# Patient Record
Sex: FEMALE | Race: WHITE | NOT HISPANIC OR LATINO
[De-identification: names, ages, dates, MRNs, and addresses within clinical notes are randomized per-mention and may not be internally consistent; named-entity substitution may affect disease eponyms.]

---

## 2017-08-23 PROBLEM — Z00.00 ENCOUNTER FOR PREVENTIVE HEALTH EXAMINATION: Status: ACTIVE | Noted: 2017-08-23

## 2017-09-22 ENCOUNTER — APPOINTMENT (OUTPATIENT)
Dept: ANTEPARTUM | Facility: CLINIC | Age: 37
End: 2017-09-22

## 2018-01-07 ENCOUNTER — INPATIENT (INPATIENT)
Facility: HOSPITAL | Age: 38
LOS: 3 days | Discharge: HOME | End: 2018-01-11
Attending: OBSTETRICS & GYNECOLOGY | Admitting: OBSTETRICS & GYNECOLOGY
Payer: COMMERCIAL

## 2018-01-07 VITALS — HEIGHT: 63 IN | WEIGHT: 198.42 LBS

## 2018-01-07 DIAGNOSIS — O26.899 OTHER SPECIFIED PREGNANCY RELATED CONDITIONS, UNSPECIFIED TRIMESTER: ICD-10-CM

## 2018-01-07 DIAGNOSIS — Z3A.00 WEEKS OF GESTATION OF PREGNANCY NOT SPECIFIED: ICD-10-CM

## 2018-01-07 LAB
ALBUMIN SERPL ELPH-MCNC: 2.8 G/DL — LOW (ref 3.3–5)
ALP SERPL-CCNC: 336 U/L — HIGH (ref 40–120)
ALT FLD-CCNC: 31 U/L — SIGNIFICANT CHANGE UP (ref 4–33)
APPEARANCE UR: SIGNIFICANT CHANGE UP
APTT BLD: 31 SEC — SIGNIFICANT CHANGE UP (ref 27.5–37.4)
AST SERPL-CCNC: 57 U/L — HIGH (ref 4–32)
BASOPHILS # BLD AUTO: 0.08 K/UL — SIGNIFICANT CHANGE UP (ref 0–0.2)
BASOPHILS NFR BLD AUTO: 0.5 % — SIGNIFICANT CHANGE UP (ref 0–2)
BILIRUB SERPL-MCNC: 0.2 MG/DL — SIGNIFICANT CHANGE UP (ref 0.2–1.2)
BILIRUB UR-MCNC: NEGATIVE — SIGNIFICANT CHANGE UP
BLD GP AB SCN SERPL QL: NEGATIVE — SIGNIFICANT CHANGE UP
BLOOD UR QL VISUAL: HIGH
BUN SERPL-MCNC: 18 MG/DL — SIGNIFICANT CHANGE UP (ref 7–23)
CALCIUM SERPL-MCNC: 8.8 MG/DL — SIGNIFICANT CHANGE UP (ref 8.4–10.5)
CHLORIDE SERPL-SCNC: 102 MMOL/L — SIGNIFICANT CHANGE UP (ref 98–107)
CO2 SERPL-SCNC: 17 MMOL/L — LOW (ref 22–31)
COLOR SPEC: YELLOW — SIGNIFICANT CHANGE UP
CREAT ?TM UR-MCNC: 217.96 MG/DL — SIGNIFICANT CHANGE UP
CREAT SERPL-MCNC: 1.04 MG/DL — SIGNIFICANT CHANGE UP (ref 0.5–1.3)
EOSINOPHIL # BLD AUTO: 0.03 K/UL — SIGNIFICANT CHANGE UP (ref 0–0.5)
EOSINOPHIL NFR BLD AUTO: 0.2 % — SIGNIFICANT CHANGE UP (ref 0–6)
FIBRINOGEN PPP-MCNC: 631.8 MG/DL — HIGH (ref 310–510)
GLUCOSE SERPL-MCNC: 84 MG/DL — SIGNIFICANT CHANGE UP (ref 70–99)
GLUCOSE UR-MCNC: NEGATIVE — SIGNIFICANT CHANGE UP
HCT VFR BLD CALC: 48.5 % — HIGH (ref 34.5–45)
HGB BLD-MCNC: 16.1 G/DL — HIGH (ref 11.5–15.5)
IMM GRANULOCYTES # BLD AUTO: 0.09 # — SIGNIFICANT CHANGE UP
IMM GRANULOCYTES NFR BLD AUTO: 0.6 % — SIGNIFICANT CHANGE UP (ref 0–1.5)
INR BLD: 0.77 — LOW (ref 0.88–1.17)
KETONES UR-MCNC: NEGATIVE — SIGNIFICANT CHANGE UP
LDH SERPL L TO P-CCNC: 427 U/L — HIGH (ref 135–225)
LEUKOCYTE ESTERASE UR-ACNC: NEGATIVE — SIGNIFICANT CHANGE UP
LYMPHOCYTES # BLD AUTO: 1.91 K/UL — SIGNIFICANT CHANGE UP (ref 1–3.3)
LYMPHOCYTES # BLD AUTO: 12.2 % — LOW (ref 13–44)
MCHC RBC-ENTMCNC: 29.4 PG — SIGNIFICANT CHANGE UP (ref 27–34)
MCHC RBC-ENTMCNC: 33.2 % — SIGNIFICANT CHANGE UP (ref 32–36)
MCV RBC AUTO: 88.7 FL — SIGNIFICANT CHANGE UP (ref 80–100)
MONOCYTES # BLD AUTO: 1.03 K/UL — HIGH (ref 0–0.9)
MONOCYTES NFR BLD AUTO: 6.6 % — SIGNIFICANT CHANGE UP (ref 2–14)
MUCOUS THREADS # UR AUTO: SIGNIFICANT CHANGE UP
NEUTROPHILS # BLD AUTO: 12.5 K/UL — HIGH (ref 1.8–7.4)
NEUTROPHILS NFR BLD AUTO: 79.9 % — HIGH (ref 43–77)
NITRITE UR-MCNC: NEGATIVE — SIGNIFICANT CHANGE UP
NON-SQ EPI CELLS # UR AUTO: 1 — SIGNIFICANT CHANGE UP
NRBC # FLD: 0 — SIGNIFICANT CHANGE UP
PH UR: 6.5 — SIGNIFICANT CHANGE UP (ref 4.6–8)
PLATELET # BLD AUTO: 247 K/UL — SIGNIFICANT CHANGE UP (ref 150–400)
PMV BLD: 14.1 FL — HIGH (ref 7–13)
POTASSIUM SERPL-MCNC: 4.5 MMOL/L — SIGNIFICANT CHANGE UP (ref 3.5–5.3)
POTASSIUM SERPL-SCNC: 4.5 MMOL/L — SIGNIFICANT CHANGE UP (ref 3.5–5.3)
PROT SERPL-MCNC: 6.3 G/DL — SIGNIFICANT CHANGE UP (ref 6–8.3)
PROT UR-MCNC: >600 MG/DL — SIGNIFICANT CHANGE UP
PROTHROM AB SERPL-ACNC: 8.8 SEC — LOW (ref 9.8–13.1)
RBC # BLD: 5.47 M/UL — HIGH (ref 3.8–5.2)
RBC # FLD: 12.6 % — SIGNIFICANT CHANGE UP (ref 10.3–14.5)
RBC CASTS # UR COMP ASSIST: SIGNIFICANT CHANGE UP (ref 0–?)
RH IG SCN BLD-IMP: POSITIVE — SIGNIFICANT CHANGE UP
RH IG SCN BLD-IMP: POSITIVE — SIGNIFICANT CHANGE UP
SODIUM SERPL-SCNC: 136 MMOL/L — SIGNIFICANT CHANGE UP (ref 135–145)
SP GR SPEC: > 1.04 — HIGH (ref 1–1.04)
SQUAMOUS # UR AUTO: SIGNIFICANT CHANGE UP
URATE SERPL-MCNC: 6.3 MG/DL — SIGNIFICANT CHANGE UP (ref 2.5–7)
UROBILINOGEN FLD QL: NORMAL MG/DL — SIGNIFICANT CHANGE UP
WBC # BLD: 15.64 K/UL — HIGH (ref 3.8–10.5)
WBC # FLD AUTO: 15.64 K/UL — HIGH (ref 3.8–10.5)
WBC UR QL: SIGNIFICANT CHANGE UP (ref 0–?)

## 2018-01-07 RX ORDER — INFLUENZA VIRUS VACCINE 15; 15; 15; 15 UG/.5ML; UG/.5ML; UG/.5ML; UG/.5ML
0.5 SUSPENSION INTRAMUSCULAR ONCE
Qty: 0 | Refills: 0 | Status: DISCONTINUED | OUTPATIENT
Start: 2018-01-07 | End: 2018-01-11

## 2018-01-07 RX ORDER — SODIUM CHLORIDE 9 MG/ML
1000 INJECTION, SOLUTION INTRAVENOUS
Qty: 0 | Refills: 0 | Status: DISCONTINUED | OUTPATIENT
Start: 2018-01-07 | End: 2018-01-08

## 2018-01-07 RX ORDER — MAGNESIUM SULFATE 500 MG/ML
4 VIAL (ML) INJECTION ONCE
Qty: 0 | Refills: 0 | Status: COMPLETED | OUTPATIENT
Start: 2018-01-07 | End: 2018-01-07

## 2018-01-07 RX ORDER — SODIUM CHLORIDE 9 MG/ML
1000 INJECTION, SOLUTION INTRAVENOUS ONCE
Qty: 0 | Refills: 0 | Status: DISCONTINUED | OUTPATIENT
Start: 2018-01-07 | End: 2018-01-08

## 2018-01-07 RX ORDER — LABETALOL HCL 100 MG
20 TABLET ORAL ONCE
Qty: 0 | Refills: 0 | Status: COMPLETED | OUTPATIENT
Start: 2018-01-07 | End: 2018-01-07

## 2018-01-07 RX ORDER — LABETALOL HCL 100 MG
40 TABLET ORAL ONCE
Qty: 0 | Refills: 0 | Status: COMPLETED | OUTPATIENT
Start: 2018-01-07 | End: 2018-01-07

## 2018-01-07 RX ORDER — ACETAMINOPHEN 500 MG
975 TABLET ORAL ONCE
Qty: 0 | Refills: 0 | Status: COMPLETED | OUTPATIENT
Start: 2018-01-07 | End: 2018-01-07

## 2018-01-07 RX ORDER — LABETALOL HCL 100 MG
80 TABLET ORAL ONCE
Qty: 0 | Refills: 0 | Status: COMPLETED | OUTPATIENT
Start: 2018-01-07 | End: 2018-01-07

## 2018-01-07 RX ORDER — OXYTOCIN 10 UNIT/ML
333.33 VIAL (ML) INJECTION
Qty: 20 | Refills: 0 | Status: DISCONTINUED | OUTPATIENT
Start: 2018-01-07 | End: 2018-01-08

## 2018-01-07 RX ORDER — LABETALOL HCL 100 MG
200 TABLET ORAL THREE TIMES A DAY
Qty: 0 | Refills: 0 | Status: DISCONTINUED | OUTPATIENT
Start: 2018-01-07 | End: 2018-01-08

## 2018-01-07 RX ORDER — MAGNESIUM SULFATE 500 MG/ML
1 VIAL (ML) INJECTION
Qty: 40 | Refills: 0 | Status: DISCONTINUED | OUTPATIENT
Start: 2018-01-07 | End: 2018-01-08

## 2018-01-07 RX ADMIN — Medication 200 MILLIGRAM(S): at 21:56

## 2018-01-07 RX ADMIN — SODIUM CHLORIDE 50 MILLILITER(S): 9 INJECTION, SOLUTION INTRAVENOUS at 21:00

## 2018-01-07 RX ADMIN — Medication 975 MILLIGRAM(S): at 21:58

## 2018-01-07 RX ADMIN — Medication 50 GM/HR: at 21:58

## 2018-01-07 RX ADMIN — Medication 20 MILLIGRAM(S): at 20:57

## 2018-01-07 RX ADMIN — Medication 40 MILLIGRAM(S): at 21:08

## 2018-01-07 RX ADMIN — Medication 80 MILLIGRAM(S): at 21:25

## 2018-01-07 RX ADMIN — Medication 300 GRAM(S): at 21:35

## 2018-01-08 ENCOUNTER — RESULT REVIEW (OUTPATIENT)
Age: 38
End: 2018-01-08

## 2018-01-08 ENCOUNTER — TRANSCRIPTION ENCOUNTER (OUTPATIENT)
Age: 38
End: 2018-01-08

## 2018-01-08 LAB
ALBUMIN SERPL ELPH-MCNC: 2 G/DL — LOW (ref 3.3–5)
ALBUMIN SERPL ELPH-MCNC: 2.3 G/DL — LOW (ref 3.3–5)
ALBUMIN SERPL ELPH-MCNC: 2.4 G/DL — LOW (ref 3.3–5)
ALBUMIN SERPL ELPH-MCNC: 2.4 G/DL — LOW (ref 3.3–5)
ALBUMIN SERPL ELPH-MCNC: 2.5 G/DL — LOW (ref 3.3–5)
ALP SERPL-CCNC: 220 U/L — HIGH (ref 40–120)
ALP SERPL-CCNC: 276 U/L — HIGH (ref 40–120)
ALP SERPL-CCNC: 277 U/L — HIGH (ref 40–120)
ALP SERPL-CCNC: 287 U/L — HIGH (ref 40–120)
ALP SERPL-CCNC: 289 U/L — HIGH (ref 40–120)
ALT FLD-CCNC: 24 U/L — SIGNIFICANT CHANGE UP (ref 4–33)
ALT FLD-CCNC: 30 U/L — SIGNIFICANT CHANGE UP (ref 4–33)
ALT FLD-CCNC: 30 U/L — SIGNIFICANT CHANGE UP (ref 4–33)
ALT FLD-CCNC: 31 U/L — SIGNIFICANT CHANGE UP (ref 4–33)
ALT FLD-CCNC: 32 U/L — SIGNIFICANT CHANGE UP (ref 4–33)
APTT BLD: 29.2 SEC — SIGNIFICANT CHANGE UP (ref 27.5–37.4)
APTT BLD: 29.3 SEC — SIGNIFICANT CHANGE UP (ref 27.5–37.4)
APTT BLD: 29.8 SEC — SIGNIFICANT CHANGE UP (ref 27.5–37.4)
AST SERPL-CCNC: 34 U/L — HIGH (ref 4–32)
AST SERPL-CCNC: 45 U/L — HIGH (ref 4–32)
AST SERPL-CCNC: 46 U/L — HIGH (ref 4–32)
AST SERPL-CCNC: 46 U/L — HIGH (ref 4–32)
AST SERPL-CCNC: 53 U/L — HIGH (ref 4–32)
BASOPHILS # BLD AUTO: 0.02 K/UL — SIGNIFICANT CHANGE UP (ref 0–0.2)
BASOPHILS # BLD AUTO: 0.04 K/UL — SIGNIFICANT CHANGE UP (ref 0–0.2)
BASOPHILS # BLD AUTO: 0.05 K/UL — SIGNIFICANT CHANGE UP (ref 0–0.2)
BASOPHILS # BLD AUTO: 0.05 K/UL — SIGNIFICANT CHANGE UP (ref 0–0.2)
BASOPHILS NFR BLD AUTO: 0.1 % — SIGNIFICANT CHANGE UP (ref 0–2)
BASOPHILS NFR BLD AUTO: 0.3 % — SIGNIFICANT CHANGE UP (ref 0–2)
BASOPHILS NFR BLD AUTO: 0.3 % — SIGNIFICANT CHANGE UP (ref 0–2)
BASOPHILS NFR BLD AUTO: 0.4 % — SIGNIFICANT CHANGE UP (ref 0–2)
BILIRUB SERPL-MCNC: < 0.2 MG/DL — LOW (ref 0.2–1.2)
BUN SERPL-MCNC: 17 MG/DL — SIGNIFICANT CHANGE UP (ref 7–23)
BUN SERPL-MCNC: 18 MG/DL — SIGNIFICANT CHANGE UP (ref 7–23)
BUN SERPL-MCNC: 18 MG/DL — SIGNIFICANT CHANGE UP (ref 7–23)
BUN SERPL-MCNC: 19 MG/DL — SIGNIFICANT CHANGE UP (ref 7–23)
BUN SERPL-MCNC: 19 MG/DL — SIGNIFICANT CHANGE UP (ref 7–23)
CALCIUM SERPL-MCNC: 6.8 MG/DL — LOW (ref 8.4–10.5)
CALCIUM SERPL-MCNC: 7.3 MG/DL — LOW (ref 8.4–10.5)
CALCIUM SERPL-MCNC: 7.3 MG/DL — LOW (ref 8.4–10.5)
CALCIUM SERPL-MCNC: 7.5 MG/DL — LOW (ref 8.4–10.5)
CALCIUM SERPL-MCNC: 7.8 MG/DL — LOW (ref 8.4–10.5)
CHLORIDE SERPL-SCNC: 100 MMOL/L — SIGNIFICANT CHANGE UP (ref 98–107)
CHLORIDE SERPL-SCNC: 100 MMOL/L — SIGNIFICANT CHANGE UP (ref 98–107)
CHLORIDE SERPL-SCNC: 101 MMOL/L — SIGNIFICANT CHANGE UP (ref 98–107)
CHLORIDE SERPL-SCNC: 105 MMOL/L — SIGNIFICANT CHANGE UP (ref 98–107)
CHLORIDE SERPL-SCNC: 99 MMOL/L — SIGNIFICANT CHANGE UP (ref 98–107)
CO2 SERPL-SCNC: 15 MMOL/L — LOW (ref 22–31)
CO2 SERPL-SCNC: 15 MMOL/L — LOW (ref 22–31)
CO2 SERPL-SCNC: 16 MMOL/L — LOW (ref 22–31)
CO2 SERPL-SCNC: 16 MMOL/L — LOW (ref 22–31)
CO2 SERPL-SCNC: 17 MMOL/L — LOW (ref 22–31)
CREAT SERPL-MCNC: 0.92 MG/DL — SIGNIFICANT CHANGE UP (ref 0.5–1.3)
CREAT SERPL-MCNC: 1.07 MG/DL — SIGNIFICANT CHANGE UP (ref 0.5–1.3)
CREAT SERPL-MCNC: 1.09 MG/DL — SIGNIFICANT CHANGE UP (ref 0.5–1.3)
CREAT SERPL-MCNC: 1.13 MG/DL — SIGNIFICANT CHANGE UP (ref 0.5–1.3)
CREAT SERPL-MCNC: 1.14 MG/DL — SIGNIFICANT CHANGE UP (ref 0.5–1.3)
EOSINOPHIL # BLD AUTO: 0 K/UL — SIGNIFICANT CHANGE UP (ref 0–0.5)
EOSINOPHIL # BLD AUTO: 0 K/UL — SIGNIFICANT CHANGE UP (ref 0–0.5)
EOSINOPHIL # BLD AUTO: 0.01 K/UL — SIGNIFICANT CHANGE UP (ref 0–0.5)
EOSINOPHIL # BLD AUTO: 0.01 K/UL — SIGNIFICANT CHANGE UP (ref 0–0.5)
EOSINOPHIL NFR BLD AUTO: 0 % — SIGNIFICANT CHANGE UP (ref 0–6)
EOSINOPHIL NFR BLD AUTO: 0 % — SIGNIFICANT CHANGE UP (ref 0–6)
EOSINOPHIL NFR BLD AUTO: 0.1 % — SIGNIFICANT CHANGE UP (ref 0–6)
EOSINOPHIL NFR BLD AUTO: 0.1 % — SIGNIFICANT CHANGE UP (ref 0–6)
FIBRINOGEN PPP-MCNC: 530 MG/DL — HIGH (ref 310–510)
FIBRINOGEN PPP-MCNC: 535 MG/DL — HIGH (ref 310–510)
FIBRINOGEN PPP-MCNC: 590 MG/DL — HIGH (ref 310–510)
FIBRINOGEN PPP-MCNC: 591 MG/DL — HIGH (ref 310–510)
GLUCOSE SERPL-MCNC: 100 MG/DL — HIGH (ref 70–99)
GLUCOSE SERPL-MCNC: 101 MG/DL — HIGH (ref 70–99)
GLUCOSE SERPL-MCNC: 102 MG/DL — HIGH (ref 70–99)
GLUCOSE SERPL-MCNC: 94 MG/DL — SIGNIFICANT CHANGE UP (ref 70–99)
GLUCOSE SERPL-MCNC: 98 MG/DL — SIGNIFICANT CHANGE UP (ref 70–99)
HCT VFR BLD CALC: 39.7 % — SIGNIFICANT CHANGE UP (ref 34.5–45)
HCT VFR BLD CALC: 41 % — SIGNIFICANT CHANGE UP (ref 34.5–45)
HCT VFR BLD CALC: 42.2 % — SIGNIFICANT CHANGE UP (ref 34.5–45)
HCT VFR BLD CALC: 42.7 % — SIGNIFICANT CHANGE UP (ref 34.5–45)
HGB BLD-MCNC: 13.9 G/DL — SIGNIFICANT CHANGE UP (ref 11.5–15.5)
HGB BLD-MCNC: 14.8 G/DL — SIGNIFICANT CHANGE UP (ref 11.5–15.5)
HGB BLD-MCNC: 14.9 G/DL — SIGNIFICANT CHANGE UP (ref 11.5–15.5)
HGB BLD-MCNC: 15.2 G/DL — SIGNIFICANT CHANGE UP (ref 11.5–15.5)
IMM GRANULOCYTES # BLD AUTO: 0.06 # — SIGNIFICANT CHANGE UP
IMM GRANULOCYTES # BLD AUTO: 0.06 # — SIGNIFICANT CHANGE UP
IMM GRANULOCYTES # BLD AUTO: 0.07 # — SIGNIFICANT CHANGE UP
IMM GRANULOCYTES # BLD AUTO: 0.1 # — SIGNIFICANT CHANGE UP
IMM GRANULOCYTES NFR BLD AUTO: 0.4 % — SIGNIFICANT CHANGE UP (ref 0–1.5)
IMM GRANULOCYTES NFR BLD AUTO: 0.4 % — SIGNIFICANT CHANGE UP (ref 0–1.5)
IMM GRANULOCYTES NFR BLD AUTO: 0.6 % — SIGNIFICANT CHANGE UP (ref 0–1.5)
IMM GRANULOCYTES NFR BLD AUTO: 0.8 % — SIGNIFICANT CHANGE UP (ref 0–1.5)
INR BLD: 0.77 — LOW (ref 0.88–1.17)
INR BLD: 0.79 — LOW (ref 0.88–1.17)
INR BLD: 0.8 — LOW (ref 0.88–1.17)
LDH SERPL L TO P-CCNC: 329 U/L — HIGH (ref 135–225)
LDH SERPL L TO P-CCNC: 358 U/L — HIGH (ref 135–225)
LDH SERPL L TO P-CCNC: 368 U/L — HIGH (ref 135–225)
LDH SERPL L TO P-CCNC: 382 U/L — HIGH (ref 135–225)
LYMPHOCYTES # BLD AUTO: 0.96 K/UL — LOW (ref 1–3.3)
LYMPHOCYTES # BLD AUTO: 1.46 K/UL — SIGNIFICANT CHANGE UP (ref 1–3.3)
LYMPHOCYTES # BLD AUTO: 1.52 K/UL — SIGNIFICANT CHANGE UP (ref 1–3.3)
LYMPHOCYTES # BLD AUTO: 1.56 K/UL — SIGNIFICANT CHANGE UP (ref 1–3.3)
LYMPHOCYTES # BLD AUTO: 10.3 % — LOW (ref 13–44)
LYMPHOCYTES # BLD AUTO: 12 % — LOW (ref 13–44)
LYMPHOCYTES # BLD AUTO: 13.2 % — SIGNIFICANT CHANGE UP (ref 13–44)
LYMPHOCYTES # BLD AUTO: 6.4 % — LOW (ref 13–44)
MAGNESIUM SERPL-MCNC: 4.4 MG/DL — HIGH (ref 1.6–2.6)
MAGNESIUM SERPL-MCNC: 6.1 MG/DL — HIGH (ref 1.6–2.6)
MAGNESIUM SERPL-MCNC: 7.1 MG/DL — CRITICAL HIGH (ref 1.6–2.6)
MAGNESIUM SERPL-MCNC: 7.8 MG/DL — CRITICAL HIGH (ref 1.6–2.6)
MCHC RBC-ENTMCNC: 31 PG — SIGNIFICANT CHANGE UP (ref 27–34)
MCHC RBC-ENTMCNC: 31.1 PG — SIGNIFICANT CHANGE UP (ref 27–34)
MCHC RBC-ENTMCNC: 31.6 PG — SIGNIFICANT CHANGE UP (ref 27–34)
MCHC RBC-ENTMCNC: 32.1 PG — SIGNIFICANT CHANGE UP (ref 27–34)
MCHC RBC-ENTMCNC: 35 % — SIGNIFICANT CHANGE UP (ref 32–36)
MCHC RBC-ENTMCNC: 35.1 % — SIGNIFICANT CHANGE UP (ref 32–36)
MCHC RBC-ENTMCNC: 35.6 % — SIGNIFICANT CHANGE UP (ref 32–36)
MCHC RBC-ENTMCNC: 36.3 % — HIGH (ref 32–36)
MCV RBC AUTO: 88.4 FL — SIGNIFICANT CHANGE UP (ref 80–100)
MCV RBC AUTO: 88.6 FL — SIGNIFICANT CHANGE UP (ref 80–100)
MCV RBC AUTO: 88.7 FL — SIGNIFICANT CHANGE UP (ref 80–100)
MCV RBC AUTO: 88.8 FL — SIGNIFICANT CHANGE UP (ref 80–100)
MONOCYTES # BLD AUTO: 0.59 K/UL — SIGNIFICANT CHANGE UP (ref 0–0.9)
MONOCYTES # BLD AUTO: 0.69 K/UL — SIGNIFICANT CHANGE UP (ref 0–0.9)
MONOCYTES # BLD AUTO: 0.74 K/UL — SIGNIFICANT CHANGE UP (ref 0–0.9)
MONOCYTES # BLD AUTO: 0.96 K/UL — HIGH (ref 0–0.9)
MONOCYTES NFR BLD AUTO: 3.9 % — SIGNIFICANT CHANGE UP (ref 2–14)
MONOCYTES NFR BLD AUTO: 4.7 % — SIGNIFICANT CHANGE UP (ref 2–14)
MONOCYTES NFR BLD AUTO: 6.1 % — SIGNIFICANT CHANGE UP (ref 2–14)
MONOCYTES NFR BLD AUTO: 8.1 % — SIGNIFICANT CHANGE UP (ref 2–14)
NEUTROPHILS # BLD AUTO: 12.47 K/UL — HIGH (ref 1.8–7.4)
NEUTROPHILS # BLD AUTO: 13.41 K/UL — HIGH (ref 1.8–7.4)
NEUTROPHILS # BLD AUTO: 9.17 K/UL — HIGH (ref 1.8–7.4)
NEUTROPHILS # BLD AUTO: 9.77 K/UL — HIGH (ref 1.8–7.4)
NEUTROPHILS NFR BLD AUTO: 77.7 % — HIGH (ref 43–77)
NEUTROPHILS NFR BLD AUTO: 80.6 % — HIGH (ref 43–77)
NEUTROPHILS NFR BLD AUTO: 84.3 % — HIGH (ref 43–77)
NEUTROPHILS NFR BLD AUTO: 89.2 % — HIGH (ref 43–77)
NRBC # FLD: 0 — SIGNIFICANT CHANGE UP
PLATELET # BLD AUTO: 191 K/UL — SIGNIFICANT CHANGE UP (ref 150–400)
PLATELET # BLD AUTO: 193 K/UL — SIGNIFICANT CHANGE UP (ref 150–400)
PLATELET # BLD AUTO: 195 K/UL — SIGNIFICANT CHANGE UP (ref 150–400)
PLATELET # BLD AUTO: 204 K/UL — SIGNIFICANT CHANGE UP (ref 150–400)
PMV BLD: 13.1 FL — HIGH (ref 7–13)
PMV BLD: 13.5 FL — HIGH (ref 7–13)
PMV BLD: 13.8 FL — HIGH (ref 7–13)
PMV BLD: 14 FL — HIGH (ref 7–13)
POTASSIUM SERPL-MCNC: 4.8 MMOL/L — SIGNIFICANT CHANGE UP (ref 3.5–5.3)
POTASSIUM SERPL-MCNC: 4.9 MMOL/L — SIGNIFICANT CHANGE UP (ref 3.5–5.3)
POTASSIUM SERPL-MCNC: 4.9 MMOL/L — SIGNIFICANT CHANGE UP (ref 3.5–5.3)
POTASSIUM SERPL-SCNC: 4.8 MMOL/L — SIGNIFICANT CHANGE UP (ref 3.5–5.3)
POTASSIUM SERPL-SCNC: 4.9 MMOL/L — SIGNIFICANT CHANGE UP (ref 3.5–5.3)
POTASSIUM SERPL-SCNC: 4.9 MMOL/L — SIGNIFICANT CHANGE UP (ref 3.5–5.3)
PROT SERPL-MCNC: 4.3 G/DL — LOW (ref 6–8.3)
PROT SERPL-MCNC: 5 G/DL — LOW (ref 6–8.3)
PROT SERPL-MCNC: 5.2 G/DL — LOW (ref 6–8.3)
PROT SERPL-MCNC: 5.4 G/DL — LOW (ref 6–8.3)
PROT SERPL-MCNC: 5.4 G/DL — LOW (ref 6–8.3)
PROT UR-MCNC: > 200 MG/DL — SIGNIFICANT CHANGE UP
PROTHROM AB SERPL-ACNC: 8.7 SEC — LOW (ref 9.8–13.1)
PROTHROM AB SERPL-ACNC: 8.8 SEC — LOW (ref 9.8–13.1)
PROTHROM AB SERPL-ACNC: 8.8 SEC — LOW (ref 9.8–13.1)
RBC # BLD: 4.48 M/UL — SIGNIFICANT CHANGE UP (ref 3.8–5.2)
RBC # BLD: 4.64 M/UL — SIGNIFICANT CHANGE UP (ref 3.8–5.2)
RBC # BLD: 4.76 M/UL — SIGNIFICANT CHANGE UP (ref 3.8–5.2)
RBC # BLD: 4.81 M/UL — SIGNIFICANT CHANGE UP (ref 3.8–5.2)
RBC # FLD: 12.7 % — SIGNIFICANT CHANGE UP (ref 10.3–14.5)
RBC # FLD: 12.8 % — SIGNIFICANT CHANGE UP (ref 10.3–14.5)
RBC # FLD: 12.9 % — SIGNIFICANT CHANGE UP (ref 10.3–14.5)
RBC # FLD: 12.9 % — SIGNIFICANT CHANGE UP (ref 10.3–14.5)
SODIUM SERPL-SCNC: 131 MMOL/L — LOW (ref 135–145)
SODIUM SERPL-SCNC: 132 MMOL/L — LOW (ref 135–145)
SODIUM SERPL-SCNC: 132 MMOL/L — LOW (ref 135–145)
SODIUM SERPL-SCNC: 133 MMOL/L — LOW (ref 135–145)
SODIUM SERPL-SCNC: 136 MMOL/L — SIGNIFICANT CHANGE UP (ref 135–145)
T PALLIDUM AB TITR SER: NEGATIVE — SIGNIFICANT CHANGE UP
URATE SERPL-MCNC: 6.4 MG/DL — SIGNIFICANT CHANGE UP (ref 2.5–7)
URATE SERPL-MCNC: 6.5 MG/DL — SIGNIFICANT CHANGE UP (ref 2.5–7)
URATE SERPL-MCNC: 6.8 MG/DL — SIGNIFICANT CHANGE UP (ref 2.5–7)
WBC # BLD: 11.81 K/UL — HIGH (ref 3.8–10.5)
WBC # BLD: 12.13 K/UL — HIGH (ref 3.8–10.5)
WBC # BLD: 14.79 K/UL — HIGH (ref 3.8–10.5)
WBC # BLD: 15.04 K/UL — HIGH (ref 3.8–10.5)
WBC # FLD AUTO: 11.81 K/UL — HIGH (ref 3.8–10.5)
WBC # FLD AUTO: 12.13 K/UL — HIGH (ref 3.8–10.5)
WBC # FLD AUTO: 14.79 K/UL — HIGH (ref 3.8–10.5)
WBC # FLD AUTO: 15.04 K/UL — HIGH (ref 3.8–10.5)

## 2018-01-08 PROCEDURE — 88307 TISSUE EXAM BY PATHOLOGIST: CPT | Mod: 26

## 2018-01-08 RX ORDER — CITRIC ACID/SODIUM CITRATE 300-500 MG
30 SOLUTION, ORAL ORAL ONCE
Qty: 0 | Refills: 0 | Status: DISCONTINUED | OUTPATIENT
Start: 2018-01-08 | End: 2018-01-08

## 2018-01-08 RX ORDER — OXYTOCIN 10 UNIT/ML
2 VIAL (ML) INJECTION
Qty: 30 | Refills: 0 | Status: DISCONTINUED | OUTPATIENT
Start: 2018-01-08 | End: 2018-01-08

## 2018-01-08 RX ORDER — LABETALOL HCL 100 MG
300 TABLET ORAL THREE TIMES A DAY
Qty: 0 | Refills: 0 | Status: DISCONTINUED | OUTPATIENT
Start: 2018-01-08 | End: 2018-01-08

## 2018-01-08 RX ORDER — LANOLIN
1 OINTMENT (GRAM) TOPICAL
Qty: 0 | Refills: 0 | Status: DISCONTINUED | OUTPATIENT
Start: 2018-01-09 | End: 2018-01-11

## 2018-01-08 RX ORDER — DIPHENHYDRAMINE HCL 50 MG
25 CAPSULE ORAL EVERY 6 HOURS
Qty: 0 | Refills: 0 | Status: DISCONTINUED | OUTPATIENT
Start: 2018-01-09 | End: 2018-01-11

## 2018-01-08 RX ORDER — GLYCERIN ADULT
1 SUPPOSITORY, RECTAL RECTAL AT BEDTIME
Qty: 0 | Refills: 0 | Status: DISCONTINUED | OUTPATIENT
Start: 2018-01-09 | End: 2018-01-11

## 2018-01-08 RX ORDER — SIMETHICONE 80 MG/1
80 TABLET, CHEWABLE ORAL EVERY 4 HOURS
Qty: 0 | Refills: 0 | Status: DISCONTINUED | OUTPATIENT
Start: 2018-01-09 | End: 2018-01-11

## 2018-01-08 RX ORDER — TETANUS TOXOID, REDUCED DIPHTHERIA TOXOID AND ACELLULAR PERTUSSIS VACCINE, ADSORBED 5; 2.5; 8; 8; 2.5 [IU]/.5ML; [IU]/.5ML; UG/.5ML; UG/.5ML; UG/.5ML
0.5 SUSPENSION INTRAMUSCULAR ONCE
Qty: 0 | Refills: 0 | Status: DISCONTINUED | OUTPATIENT
Start: 2018-01-09 | End: 2018-01-11

## 2018-01-08 RX ORDER — LABETALOL HCL 100 MG
300 TABLET ORAL EVERY 8 HOURS
Qty: 0 | Refills: 0 | Status: DISCONTINUED | OUTPATIENT
Start: 2018-01-08 | End: 2018-01-09

## 2018-01-08 RX ORDER — HYDRALAZINE HCL 50 MG
5 TABLET ORAL ONCE
Qty: 0 | Refills: 0 | Status: COMPLETED | OUTPATIENT
Start: 2018-01-08 | End: 2018-01-08

## 2018-01-08 RX ORDER — DOCUSATE SODIUM 100 MG
100 CAPSULE ORAL
Qty: 0 | Refills: 0 | Status: DISCONTINUED | OUTPATIENT
Start: 2018-01-09 | End: 2018-01-11

## 2018-01-08 RX ORDER — ACETAMINOPHEN 500 MG
975 TABLET ORAL ONCE
Qty: 0 | Refills: 0 | Status: COMPLETED | OUTPATIENT
Start: 2018-01-08 | End: 2018-01-08

## 2018-01-08 RX ORDER — OXYTOCIN 10 UNIT/ML
16.67 VIAL (ML) INJECTION
Qty: 20 | Refills: 0 | Status: DISCONTINUED | OUTPATIENT
Start: 2018-01-08 | End: 2018-01-09

## 2018-01-08 RX ORDER — MAGNESIUM SULFATE 500 MG/ML
2 VIAL (ML) INJECTION
Qty: 40 | Refills: 0 | Status: DISCONTINUED | OUTPATIENT
Start: 2018-01-08 | End: 2018-01-09

## 2018-01-08 RX ORDER — HEPARIN SODIUM 5000 [USP'U]/ML
5000 INJECTION INTRAVENOUS; SUBCUTANEOUS EVERY 12 HOURS
Qty: 0 | Refills: 0 | Status: DISCONTINUED | OUTPATIENT
Start: 2018-01-08 | End: 2018-01-11

## 2018-01-08 RX ORDER — ZOLPIDEM TARTRATE 10 MG/1
5 TABLET ORAL AT BEDTIME
Qty: 0 | Refills: 0 | Status: DISCONTINUED | OUTPATIENT
Start: 2018-01-09 | End: 2018-01-11

## 2018-01-08 RX ORDER — SODIUM CHLORIDE 9 MG/ML
1000 INJECTION, SOLUTION INTRAVENOUS
Qty: 0 | Refills: 0 | Status: DISCONTINUED | OUTPATIENT
Start: 2018-01-08 | End: 2018-01-09

## 2018-01-08 RX ORDER — FAMOTIDINE 10 MG/ML
20 INJECTION INTRAVENOUS ONCE
Qty: 0 | Refills: 0 | Status: DISCONTINUED | OUTPATIENT
Start: 2018-01-08 | End: 2018-01-08

## 2018-01-08 RX ORDER — OXYTOCIN 10 UNIT/ML
333.33 VIAL (ML) INJECTION
Qty: 20 | Refills: 0 | Status: DISCONTINUED | OUTPATIENT
Start: 2018-01-08 | End: 2018-01-09

## 2018-01-08 RX ORDER — FERROUS SULFATE 325(65) MG
325 TABLET ORAL DAILY
Qty: 0 | Refills: 0 | Status: DISCONTINUED | OUTPATIENT
Start: 2018-01-09 | End: 2018-01-11

## 2018-01-08 RX ORDER — SODIUM CHLORIDE 9 MG/ML
1000 INJECTION, SOLUTION INTRAVENOUS
Qty: 0 | Refills: 0 | Status: DISCONTINUED | OUTPATIENT
Start: 2018-01-08 | End: 2018-01-08

## 2018-01-08 RX ORDER — SODIUM CHLORIDE 9 MG/ML
1000 INJECTION, SOLUTION INTRAVENOUS ONCE
Qty: 0 | Refills: 0 | Status: DISCONTINUED | OUTPATIENT
Start: 2018-01-08 | End: 2018-01-08

## 2018-01-08 RX ORDER — METOCLOPRAMIDE HCL 10 MG
10 TABLET ORAL ONCE
Qty: 0 | Refills: 0 | Status: DISCONTINUED | OUTPATIENT
Start: 2018-01-08 | End: 2018-01-08

## 2018-01-08 RX ORDER — LABETALOL HCL 100 MG
200 TABLET ORAL THREE TIMES A DAY
Qty: 0 | Refills: 0 | Status: DISCONTINUED | OUTPATIENT
Start: 2018-01-08 | End: 2018-01-08

## 2018-01-08 RX ADMIN — Medication 975 MILLIGRAM(S): at 06:40

## 2018-01-08 RX ADMIN — FAMOTIDINE 20 MILLIGRAM(S): 10 INJECTION INTRAVENOUS at 18:40

## 2018-01-08 RX ADMIN — Medication 10 MILLIGRAM(S): at 18:40

## 2018-01-08 RX ADMIN — Medication 50 GM/HR: at 23:24

## 2018-01-08 RX ADMIN — Medication 300 MILLIGRAM(S): at 21:37

## 2018-01-08 RX ADMIN — Medication 975 MILLIGRAM(S): at 07:08

## 2018-01-08 RX ADMIN — SODIUM CHLORIDE 50 MILLILITER(S): 9 INJECTION, SOLUTION INTRAVENOUS at 08:27

## 2018-01-08 RX ADMIN — Medication 5 MILLIGRAM(S): at 13:24

## 2018-01-08 RX ADMIN — Medication 300 MILLIGRAM(S): at 14:05

## 2018-01-08 RX ADMIN — Medication 50 GM/HR: at 07:14

## 2018-01-08 RX ADMIN — HEPARIN SODIUM 5000 UNIT(S): 5000 INJECTION INTRAVENOUS; SUBCUTANEOUS at 23:35

## 2018-01-08 RX ADMIN — Medication 25 GM/HR: at 13:26

## 2018-01-08 RX ADMIN — Medication 30 MILLILITER(S): at 18:47

## 2018-01-08 RX ADMIN — Medication 200 MILLIGRAM(S): at 06:05

## 2018-01-08 NOTE — DISCHARGE NOTE OB - CARE PROVIDER_API CALL
Luis Colon), Obstetrics and Gynecology  [address] [place] [address]  Phone: [phone]  Fax: [phone]

## 2018-01-08 NOTE — DISCHARGE NOTE OB - PATIENT PORTAL LINK FT
“You can access the [facility] Patient Portal, offered by [facility], by registering with the following website: [email or web address]”

## 2018-01-08 NOTE — DISCHARGE NOTE OB - MEDICATION SUMMARY - MEDICATIONS TO TAKE
I will START or STAY ON the medications listed below when I get home from the hospital:  None I will START or STAY ON the medications listed below when I get home from the hospital:    Percocet 5/325 oral tablet  -- 1 tab(s) by mouth every 6 hours MDD:4 tabs  -- Caution federal law prohibits the transfer of this drug to any person other  than the person for whom it was prescribed.  May cause drowsiness.  Alcohol may intensify this effect.  Use care when operating dangerous machinery.  This prescription cannot be refilled.  This product contains acetaminophen.  Do not use  with any other product containing acetaminophen to prevent possible liver damage.  Using more of this medication than prescribed may cause serious breathing problems.    -- Indication: For severe pain    labetalol 200 mg oral tablet  -- 2 tab(s) by mouth 3 times a day  -- Indication: For blood pressure control    NIFEdipine 30 mg oral tablet, extended release  -- 1 tab(s) by mouth once a day  -- Indication: For blood pressure control

## 2018-01-09 DIAGNOSIS — O26.899 OTHER SPECIFIED PREGNANCY RELATED CONDITIONS, UNSPECIFIED TRIMESTER: ICD-10-CM

## 2018-01-09 LAB
ALBUMIN SERPL ELPH-MCNC: 2.2 G/DL — LOW (ref 3.3–5)
ALBUMIN SERPL ELPH-MCNC: 2.2 G/DL — LOW (ref 3.3–5)
ALP SERPL-CCNC: 194 U/L — HIGH (ref 40–120)
ALP SERPL-CCNC: 231 U/L — HIGH (ref 40–120)
ALT FLD-CCNC: 22 U/L — SIGNIFICANT CHANGE UP (ref 4–33)
ALT FLD-CCNC: 23 U/L — SIGNIFICANT CHANGE UP (ref 4–33)
APTT BLD: 28.3 SEC — SIGNIFICANT CHANGE UP (ref 27.5–37.4)
APTT BLD: 28.4 SEC — SIGNIFICANT CHANGE UP (ref 27.5–37.4)
AST SERPL-CCNC: 39 U/L — HIGH (ref 4–32)
AST SERPL-CCNC: 45 U/L — HIGH (ref 4–32)
BASOPHILS # BLD AUTO: 0.05 K/UL — SIGNIFICANT CHANGE UP (ref 0–0.2)
BASOPHILS # BLD AUTO: 0.05 K/UL — SIGNIFICANT CHANGE UP (ref 0–0.2)
BASOPHILS NFR BLD AUTO: 0.3 % — SIGNIFICANT CHANGE UP (ref 0–2)
BASOPHILS NFR BLD AUTO: 0.3 % — SIGNIFICANT CHANGE UP (ref 0–2)
BILIRUB SERPL-MCNC: < 0.2 MG/DL — LOW (ref 0.2–1.2)
BILIRUB SERPL-MCNC: < 0.2 MG/DL — LOW (ref 0.2–1.2)
BUN SERPL-MCNC: 19 MG/DL — SIGNIFICANT CHANGE UP (ref 7–23)
BUN SERPL-MCNC: 26 MG/DL — HIGH (ref 7–23)
CALCIUM SERPL-MCNC: 6.7 MG/DL — LOW (ref 8.4–10.5)
CALCIUM SERPL-MCNC: 6.8 MG/DL — LOW (ref 8.4–10.5)
CHLORIDE SERPL-SCNC: 101 MMOL/L — SIGNIFICANT CHANGE UP (ref 98–107)
CHLORIDE SERPL-SCNC: 98 MMOL/L — SIGNIFICANT CHANGE UP (ref 98–107)
CO2 SERPL-SCNC: 19 MMOL/L — LOW (ref 22–31)
CO2 SERPL-SCNC: 20 MMOL/L — LOW (ref 22–31)
CREAT SERPL-MCNC: 0.99 MG/DL — SIGNIFICANT CHANGE UP (ref 0.5–1.3)
CREAT SERPL-MCNC: 1.07 MG/DL — SIGNIFICANT CHANGE UP (ref 0.5–1.3)
EOSINOPHIL # BLD AUTO: 0.03 K/UL — SIGNIFICANT CHANGE UP (ref 0–0.5)
EOSINOPHIL # BLD AUTO: 0.14 K/UL — SIGNIFICANT CHANGE UP (ref 0–0.5)
EOSINOPHIL NFR BLD AUTO: 0.2 % — SIGNIFICANT CHANGE UP (ref 0–6)
EOSINOPHIL NFR BLD AUTO: 1 % — SIGNIFICANT CHANGE UP (ref 0–6)
FIBRINOGEN PPP-MCNC: 585.8 MG/DL — HIGH (ref 310–510)
FIBRINOGEN PPP-MCNC: 657.8 MG/DL — HIGH (ref 310–510)
GLUCOSE SERPL-MCNC: 103 MG/DL — HIGH (ref 70–99)
GLUCOSE SERPL-MCNC: 83 MG/DL — SIGNIFICANT CHANGE UP (ref 70–99)
HCT VFR BLD CALC: 36.3 % — SIGNIFICANT CHANGE UP (ref 34.5–45)
HCT VFR BLD CALC: 40.2 % — SIGNIFICANT CHANGE UP (ref 34.5–45)
HGB BLD-MCNC: 12.8 G/DL — SIGNIFICANT CHANGE UP (ref 11.5–15.5)
HGB BLD-MCNC: 13.9 G/DL — SIGNIFICANT CHANGE UP (ref 11.5–15.5)
IMM GRANULOCYTES # BLD AUTO: 0.06 # — SIGNIFICANT CHANGE UP
IMM GRANULOCYTES # BLD AUTO: 0.1 # — SIGNIFICANT CHANGE UP
IMM GRANULOCYTES NFR BLD AUTO: 0.4 % — SIGNIFICANT CHANGE UP (ref 0–1.5)
IMM GRANULOCYTES NFR BLD AUTO: 0.7 % — SIGNIFICANT CHANGE UP (ref 0–1.5)
INR BLD: 0.73 — LOW (ref 0.88–1.17)
INR BLD: 0.78 — LOW (ref 0.88–1.17)
LDH SERPL L TO P-CCNC: 410 U/L — HIGH (ref 135–225)
LDH SERPL L TO P-CCNC: 414 U/L — HIGH (ref 135–225)
LYMPHOCYTES # BLD AUTO: 1.75 K/UL — SIGNIFICANT CHANGE UP (ref 1–3.3)
LYMPHOCYTES # BLD AUTO: 1.87 K/UL — SIGNIFICANT CHANGE UP (ref 1–3.3)
LYMPHOCYTES # BLD AUTO: 11.2 % — LOW (ref 13–44)
LYMPHOCYTES # BLD AUTO: 12.8 % — LOW (ref 13–44)
MAGNESIUM SERPL-MCNC: 7.5 MG/DL — CRITICAL HIGH (ref 1.6–2.6)
MCHC RBC-ENTMCNC: 30.6 PG — SIGNIFICANT CHANGE UP (ref 27–34)
MCHC RBC-ENTMCNC: 31.4 PG — SIGNIFICANT CHANGE UP (ref 27–34)
MCHC RBC-ENTMCNC: 34.6 % — SIGNIFICANT CHANGE UP (ref 32–36)
MCHC RBC-ENTMCNC: 35.3 % — SIGNIFICANT CHANGE UP (ref 32–36)
MCV RBC AUTO: 88.5 FL — SIGNIFICANT CHANGE UP (ref 80–100)
MCV RBC AUTO: 89.2 FL — SIGNIFICANT CHANGE UP (ref 80–100)
MONOCYTES # BLD AUTO: 1.02 K/UL — HIGH (ref 0–0.9)
MONOCYTES # BLD AUTO: 1.15 K/UL — HIGH (ref 0–0.9)
MONOCYTES NFR BLD AUTO: 7 % — SIGNIFICANT CHANGE UP (ref 2–14)
MONOCYTES NFR BLD AUTO: 7.4 % — SIGNIFICANT CHANGE UP (ref 2–14)
NEUTROPHILS # BLD AUTO: 11.48 K/UL — HIGH (ref 1.8–7.4)
NEUTROPHILS # BLD AUTO: 12.6 K/UL — HIGH (ref 1.8–7.4)
NEUTROPHILS NFR BLD AUTO: 78.2 % — HIGH (ref 43–77)
NEUTROPHILS NFR BLD AUTO: 80.5 % — HIGH (ref 43–77)
NRBC # FLD: 0 — SIGNIFICANT CHANGE UP
NRBC # FLD: 0.02 — SIGNIFICANT CHANGE UP
PLATELET # BLD AUTO: 171 K/UL — SIGNIFICANT CHANGE UP (ref 150–400)
PLATELET # BLD AUTO: 205 K/UL — SIGNIFICANT CHANGE UP (ref 150–400)
PMV BLD: 13.2 FL — HIGH (ref 7–13)
PMV BLD: 13.3 FL — HIGH (ref 7–13)
POTASSIUM SERPL-MCNC: 4.8 MMOL/L — SIGNIFICANT CHANGE UP (ref 3.5–5.3)
POTASSIUM SERPL-MCNC: 4.8 MMOL/L — SIGNIFICANT CHANGE UP (ref 3.5–5.3)
POTASSIUM SERPL-SCNC: 4.8 MMOL/L — SIGNIFICANT CHANGE UP (ref 3.5–5.3)
POTASSIUM SERPL-SCNC: 4.8 MMOL/L — SIGNIFICANT CHANGE UP (ref 3.5–5.3)
PROT SERPL-MCNC: 4.7 G/DL — LOW (ref 6–8.3)
PROT SERPL-MCNC: 4.8 G/DL — LOW (ref 6–8.3)
PROTHROM AB SERPL-ACNC: 8.3 SEC — LOW (ref 9.8–13.1)
PROTHROM AB SERPL-ACNC: 8.9 SEC — LOW (ref 9.8–13.1)
RBC # BLD: 4.07 M/UL — SIGNIFICANT CHANGE UP (ref 3.8–5.2)
RBC # BLD: 4.54 M/UL — SIGNIFICANT CHANGE UP (ref 3.8–5.2)
RBC # FLD: 13 % — SIGNIFICANT CHANGE UP (ref 10.3–14.5)
RBC # FLD: 13 % — SIGNIFICANT CHANGE UP (ref 10.3–14.5)
SODIUM SERPL-SCNC: 129 MMOL/L — LOW (ref 135–145)
SODIUM SERPL-SCNC: 131 MMOL/L — LOW (ref 135–145)
URATE SERPL-MCNC: 6.2 MG/DL — SIGNIFICANT CHANGE UP (ref 2.5–7)
URATE SERPL-MCNC: 6.5 MG/DL — SIGNIFICANT CHANGE UP (ref 2.5–7)
WBC # BLD: 14.66 K/UL — HIGH (ref 3.8–10.5)
WBC # BLD: 15.64 K/UL — HIGH (ref 3.8–10.5)
WBC # FLD AUTO: 14.66 K/UL — HIGH (ref 3.8–10.5)
WBC # FLD AUTO: 15.64 K/UL — HIGH (ref 3.8–10.5)

## 2018-01-09 RX ORDER — OXYCODONE HYDROCHLORIDE 5 MG/1
5 TABLET ORAL
Qty: 0 | Refills: 0 | Status: DISCONTINUED | OUTPATIENT
Start: 2018-01-09 | End: 2018-01-11

## 2018-01-09 RX ORDER — IBUPROFEN 200 MG
600 TABLET ORAL EVERY 6 HOURS
Qty: 0 | Refills: 0 | Status: DISCONTINUED | OUTPATIENT
Start: 2018-01-09 | End: 2018-01-09

## 2018-01-09 RX ORDER — NIFEDIPINE 30 MG
30 TABLET, EXTENDED RELEASE 24 HR ORAL DAILY
Qty: 0 | Refills: 0 | Status: DISCONTINUED | OUTPATIENT
Start: 2018-01-10 | End: 2018-01-11

## 2018-01-09 RX ORDER — OXYTOCIN 10 UNIT/ML
41.67 VIAL (ML) INJECTION
Qty: 20 | Refills: 0 | Status: DISCONTINUED | OUTPATIENT
Start: 2018-01-09 | End: 2018-01-09

## 2018-01-09 RX ORDER — OXYCODONE HYDROCHLORIDE 5 MG/1
5 TABLET ORAL
Qty: 0 | Refills: 0 | Status: COMPLETED | OUTPATIENT
Start: 2018-01-09 | End: 2018-01-16

## 2018-01-09 RX ORDER — ACETAMINOPHEN 500 MG
650 TABLET ORAL EVERY 6 HOURS
Qty: 0 | Refills: 0 | Status: DISCONTINUED | OUTPATIENT
Start: 2018-01-09 | End: 2018-01-09

## 2018-01-09 RX ORDER — SODIUM CHLORIDE 9 MG/ML
1000 INJECTION, SOLUTION INTRAVENOUS
Qty: 0 | Refills: 0 | Status: DISCONTINUED | OUTPATIENT
Start: 2018-01-09 | End: 2018-01-09

## 2018-01-09 RX ORDER — LABETALOL HCL 100 MG
100 TABLET ORAL ONCE
Qty: 0 | Refills: 0 | Status: COMPLETED | OUTPATIENT
Start: 2018-01-09 | End: 2018-01-09

## 2018-01-09 RX ORDER — LEVETIRACETAM 250 MG/1
500 TABLET, FILM COATED ORAL
Qty: 0 | Refills: 0 | Status: COMPLETED | OUTPATIENT
Start: 2018-01-09 | End: 2018-01-09

## 2018-01-09 RX ORDER — OXYCODONE AND ACETAMINOPHEN 5; 325 MG/1; MG/1
2 TABLET ORAL EVERY 6 HOURS
Qty: 0 | Refills: 0 | Status: DISCONTINUED | OUTPATIENT
Start: 2018-01-09 | End: 2018-01-09

## 2018-01-09 RX ORDER — ACETAMINOPHEN 500 MG
975 TABLET ORAL EVERY 6 HOURS
Qty: 0 | Refills: 0 | Status: DISCONTINUED | OUTPATIENT
Start: 2018-01-09 | End: 2018-01-11

## 2018-01-09 RX ORDER — OXYCODONE HYDROCHLORIDE 5 MG/1
5 TABLET ORAL EVERY 4 HOURS
Qty: 0 | Refills: 0 | Status: DISCONTINUED | OUTPATIENT
Start: 2018-01-09 | End: 2018-01-11

## 2018-01-09 RX ORDER — KETOROLAC TROMETHAMINE 30 MG/ML
30 SYRINGE (ML) INJECTION EVERY 6 HOURS
Qty: 0 | Refills: 0 | Status: DISCONTINUED | OUTPATIENT
Start: 2018-01-09 | End: 2018-01-09

## 2018-01-09 RX ORDER — OXYCODONE AND ACETAMINOPHEN 5; 325 MG/1; MG/1
1 TABLET ORAL
Qty: 0 | Refills: 0 | Status: DISCONTINUED | OUTPATIENT
Start: 2018-01-09 | End: 2018-01-09

## 2018-01-09 RX ORDER — LABETALOL HCL 100 MG
400 TABLET ORAL THREE TIMES A DAY
Qty: 0 | Refills: 0 | Status: DISCONTINUED | OUTPATIENT
Start: 2018-01-09 | End: 2018-01-11

## 2018-01-09 RX ORDER — LABETALOL HCL 100 MG
20 TABLET ORAL ONCE
Qty: 0 | Refills: 0 | Status: COMPLETED | OUTPATIENT
Start: 2018-01-09 | End: 2018-01-09

## 2018-01-09 RX ADMIN — Medication 30 MILLIGRAM(S): at 14:07

## 2018-01-09 RX ADMIN — LEVETIRACETAM 500 MILLIGRAM(S): 250 TABLET, FILM COATED ORAL at 11:56

## 2018-01-09 RX ADMIN — HEPARIN SODIUM 5000 UNIT(S): 5000 INJECTION INTRAVENOUS; SUBCUTANEOUS at 11:57

## 2018-01-09 RX ADMIN — Medication 100 MILLIGRAM(S): at 22:46

## 2018-01-09 RX ADMIN — Medication 100 MILLIGRAM(S): at 11:57

## 2018-01-09 RX ADMIN — Medication 300 MILLIGRAM(S): at 14:06

## 2018-01-09 RX ADMIN — Medication 300 MILLIGRAM(S): at 22:08

## 2018-01-09 RX ADMIN — Medication 30 MILLIGRAM(S): at 14:52

## 2018-01-09 RX ADMIN — SODIUM CHLORIDE 125 MILLILITER(S): 9 INJECTION, SOLUTION INTRAVENOUS at 19:00

## 2018-01-09 RX ADMIN — Medication 30 MILLIGRAM(S): at 06:10

## 2018-01-09 RX ADMIN — LEVETIRACETAM 500 MILLIGRAM(S): 250 TABLET, FILM COATED ORAL at 22:14

## 2018-01-09 RX ADMIN — Medication 30 MILLIGRAM(S): at 06:25

## 2018-01-09 RX ADMIN — OXYCODONE HYDROCHLORIDE 5 MILLIGRAM(S): 5 TABLET ORAL at 19:59

## 2018-01-09 RX ADMIN — Medication 1 TABLET(S): at 11:57

## 2018-01-09 RX ADMIN — Medication 50 GM/HR: at 03:48

## 2018-01-09 RX ADMIN — Medication 325 MILLIGRAM(S): at 11:57

## 2018-01-09 RX ADMIN — Medication 20 MILLIGRAM(S): at 22:58

## 2018-01-09 RX ADMIN — Medication 300 MILLIGRAM(S): at 06:10

## 2018-01-09 RX ADMIN — Medication 50 GM/HR: at 04:51

## 2018-01-09 NOTE — PROVIDER CONTACT NOTE (OTHER) - ACTION/TREATMENT ORDERED:
Dr Harley to discuss with chief resident and will notify RN of further action to be ordered. Will continue to monitor.

## 2018-01-09 NOTE — PROGRESS NOTE ADULT - ASSESSMENT
37y old w SPEC, on Mg, which is currently being held this AM, on labetalol, s/p C/S. Pt stable.  POD # 1

## 2018-01-09 NOTE — LACTATION INITIAL EVALUATION - LACTATION INTERVENTIONS
assisted with deep latch and positioning ,discussed  signs  of  effective  feeding and  swallowing.  discussed  compression at  breast when  nbn  stops  drinking  and  is  still sucking.  ,  discussed  how to  wake  nbn  ,  discussed  feed on  cue  ,  call when  nbn  showing  cues  . discussed  late    behavior .  instructed  to offer both  breast at a feeding ,feed on cue and safe  skin to skin.  discussed  using  double  electric  pump  and  mother  states  would  let  us  know./initiate skin to skin/initiate hand expression routine

## 2018-01-09 NOTE — LACTATION INITIAL EVALUATION - INTERVENTION OUTCOME
mother  states  would  like  to  nurse  and  bottle  feed  ,  discussed risks   and  discussed alternative  feeding./verbalizes understanding verbalizes understanding/nbn  sleepy at  breast  sucks   two  times  and  comes  off  breast. mother  states  would  like  to  nurse  and  bottle  feed  ,  discussed risks   and  discussed alternative  feeding. nbn  sleepy at  breast  sucks   two  times  and  comes  off  breast. mother  states  would  like  to  nurse  and  bottle  feed  ,  discussed risks   and  discussed alternative  feeding..  discussed triple feeding  plan  with  mother  and  rn  if  mother  decides./verbalizes understanding

## 2018-01-09 NOTE — PROVIDER CONTACT NOTE (OTHER) - SITUATION
Patient's bloodpressure at 1400 was 143/95.Dr Baeza made aware.Patient's harding output from 1000 to 1400 was 200cc.Tena Bradshaw Np made aware.

## 2018-01-09 NOTE — PROVIDER CONTACT NOTE (OTHER) - BACKGROUND
Pt is s/p C/S on 1/8/18 at 19:13. Induction for severe preeclampsia, currently on Magnesium therapy at 2gm/hr.

## 2018-01-09 NOTE — CHART NOTE - NSCHARTNOTEFT_GEN_A_CORE
R1 OB Postpartum Event Note    S - Called to bedside by MP for pt with severe range BPs.  Bp was 150/104 and 160/92 at 9:30p and pt was given her due dose of 300mg labetalol po.  At 10:30p the BP was 160/104 and 160/98.  HR has been in the 70s and 80s.  The MP gave an extra 100mg labetalol po and ordered IV labetalol 20mg.  HELLP labs were sent.    At bedside the pt denies any symptoms.  She denies HA, visual changes, SOB, ruq/epigastric pain, swelling of hands/face.  She is making normal straw colored urine w/o difficulty.  She tolerated po and ambulates w/o difficulty.      O -   Vital Signs Last 24 Hrs  T(C): 36.5 (2018 21:25), Max: 36.9 (2018 10:00)  T(F): 97.7 (2018 21:25), Max: 98.4 (2018 10:00)  HR: 85 (2018 21:25) (71 - 90)  BP: 160/92 (2018 21:32) (120/80 - 160/92)  BP(mean): 108 (2018 02:00) (84 - 108)  RR: 17 (2018 21:25) (12 - 23)  SpO2: 97% (2018 21:25) (92% - 100%)    EXAM:  Gen: nad, a&ox3  CV: rrr, no m/g/r, nl s1/s2  RESP: CTAB, no crackles  ABD: soft, NT, +BS, incision wnl  EXT: wwp, normal b/l pedal edema, no ttp  Neuro: patellar reflexes brisk b/l    LABS:                        12.8   14.66 )-----------( 171      ( 2018 22:00 )             36.3    @ 22:00    129  |  98  |  26  ----------------------------<  83  4.8   |  20  |  1.07    LIVER FUNCTIONS - ( 2018 22:00 )  Alb: 2.2 g/dL / Pro: 4.7 g/dL / ALK PHOS: 194 u/L / ALT: 22 u/L / AST: 45 u/L / GGT: x    A/P:  36yo  now POD1 s/p pLTCS for sPEC, s/p Mg, on Keppra for seizure prophylaxis with persistently elevated BPs in severe range   - f/u stat HELLP Labs   - Continue Labetalol 400mg PO TID   - recheck BP 15min after 20mg labetalol IVP -> plan for 40mg IVP if pressure is still severe   - start Procardia XL 30 qAM   - continue Keppra 500mg BID    d/w Dr. Aryan Gibbs MD PGY1

## 2018-01-09 NOTE — PROGRESS NOTE ADULT - PROBLEM SELECTOR PLAN 1
- increase out of bed and ambulation  - analgesia prn  - continue regular diet  - check CBC  - D/C mehdi (UOP adequate)  - incentive spirometry  - continue labetalol  - will discuss when to restart Mg    ARIELLE Seo, PGY1 - increase out of bed and ambulation  - analgesia prn  - continue regular diet  - check CBC  - D/C mehdi (UOP adequate)  - incentive spirometry  - continue labetalol  - will discontinue Mg, start Keppra for seizure prophylaxis    D/w VERONICA Nuñez PGY4  ARIELLE Seo, PGY1

## 2018-01-10 ENCOUNTER — TRANSCRIPTION ENCOUNTER (OUTPATIENT)
Age: 38
End: 2018-01-10

## 2018-01-10 RX ORDER — NIFEDIPINE 30 MG
10 TABLET, EXTENDED RELEASE 24 HR ORAL ONCE
Qty: 0 | Refills: 0 | Status: COMPLETED | OUTPATIENT
Start: 2018-01-10 | End: 2018-01-10

## 2018-01-10 RX ORDER — LABETALOL HCL 100 MG
40 TABLET ORAL ONCE
Qty: 0 | Refills: 0 | Status: DISCONTINUED | OUTPATIENT
Start: 2018-01-10 | End: 2018-01-10

## 2018-01-10 RX ADMIN — Medication 975 MILLIGRAM(S): at 14:03

## 2018-01-10 RX ADMIN — Medication 30 MILLIGRAM(S): at 01:34

## 2018-01-10 RX ADMIN — Medication 100 MILLIGRAM(S): at 02:59

## 2018-01-10 RX ADMIN — Medication 325 MILLIGRAM(S): at 12:51

## 2018-01-10 RX ADMIN — Medication 975 MILLIGRAM(S): at 00:23

## 2018-01-10 RX ADMIN — HEPARIN SODIUM 5000 UNIT(S): 5000 INJECTION INTRAVENOUS; SUBCUTANEOUS at 02:58

## 2018-01-10 RX ADMIN — Medication 975 MILLIGRAM(S): at 02:58

## 2018-01-10 RX ADMIN — Medication 400 MILLIGRAM(S): at 22:02

## 2018-01-10 RX ADMIN — Medication 1 TABLET(S): at 12:52

## 2018-01-10 RX ADMIN — Medication 10 MILLIGRAM(S): at 01:10

## 2018-01-10 RX ADMIN — Medication 400 MILLIGRAM(S): at 05:53

## 2018-01-10 RX ADMIN — Medication 30 MILLIGRAM(S): at 23:45

## 2018-01-10 RX ADMIN — HEPARIN SODIUM 5000 UNIT(S): 5000 INJECTION INTRAVENOUS; SUBCUTANEOUS at 17:44

## 2018-01-10 RX ADMIN — Medication 975 MILLIGRAM(S): at 22:02

## 2018-01-10 RX ADMIN — Medication 975 MILLIGRAM(S): at 12:51

## 2018-01-10 RX ADMIN — Medication 400 MILLIGRAM(S): at 14:04

## 2018-01-10 RX ADMIN — Medication 975 MILLIGRAM(S): at 03:20

## 2018-01-10 RX ADMIN — Medication 100 MILLIGRAM(S): at 17:45

## 2018-01-10 NOTE — PROGRESS NOTE ADULT - PROBLEM SELECTOR PLAN 1
- increase out of bed and ambulation  - analgesia PRN  - continue regular diet  - continue to monitor pressures, continue labetalol 400 TID and procardia 30 qAM    ARIELLE Seo, PGY1 - increase out of bed and ambulation  - analgesia PRN  - continue regular diet  - pt disorientation now resolved, will continue to monitor for reoccurence  - continue to monitor pressures, continue labetalol 400 TID and procardia 30 qAM    ARIELLE Seo, PGY1

## 2018-01-10 NOTE — PROVIDER CONTACT NOTE (OTHER) - ACTION/TREATMENT ORDERED:
Give ordered dose of labetalol 300 mg and repeat blood pressure in a half hour as per KIMBERLEY calderon

## 2018-01-10 NOTE — CHART NOTE - NSCHARTNOTEFT_GEN_A_CORE
R1 OB Postpartum Event Note    S - called to pt's bedside for persistent elevated BPs.  At 9:30 pt's bp was 150/104 and then 160/92 15min later.  Pt was given her dose of 300mg Labetalol po.  At 10:30p the bp was 160/104 and repeat 15min later was 160/98.  The pt was given IV labetalol 20mg x1 and an additional 100mg of labetalol po.  Repeat pressure was then 148/98.  STAT HELLP labs were sent.    Initialy at the bedside the patient reported feeling tired but otherwise well.  She denied any HA, visual changes, N/V, epigastric/ruq abd pain, SOB, or dizzyness.  She reports ambulating w/o difficulty, urinating spontaneously clear light yellow urine, and tolerating normal diet.  Initial exam was WNL.    Later at 12:30p the BP was again 164/108 and the repeat was 162/106.  During this time the patient seemed groggy and disoriented when awoken from deep sleep.  She endorsed feeling confused and "out of it" since she took oxycodone for the first time earlier this evening.  She was given 10mg Nifedipine IR po and started on Procardia XL 30mg po qd.  Repeat BP was then 124/90 and the pt was moved to [facility] for closer monitoring.      O -  Vital Signs Last 24 Hrs  T(C): 36.8 (10 Nick 2018 05:40), Max: 36.9 (2018 10:00)  T(F): 98.2 (10 Nick 2018 05:40), Max: 98.4 (2018 10:00)  HR: 74 (10 Nick 2018 05:40) (71 - 88)  BP: 117/68 (10 Nick 2018 05:40) (95/62 - 164/108)  RR: 16 (10 Nick 2018 05:40) (16 - 18)  SpO2: 96% (10 Nick 2018 05:40) (96% - 100%)    EXAM (at 12:30p):  Gen: nad, groggy, oriented to self and situation but not to date or place, tearful intermittently  CV: RRR, nl s1/s2  LUNG: CTAB  ABD: soft, NT, +BS, incision wnl  EXT: wwp, NT  NEURO: slow to respond to commands, slow speech but no slurred speech, cranial nerves intact, Full ROM UE/LE b/l, sensation intact in face/UE/LE, 5/5 strength UE/LE b/l, patellar reflexes brisk b/l    EXAM (at 12:45p):  Gen: nad, a&ox3  NEURO: normal response times, no slurred speech, cranial nerves intact, Full ROM UE/LE b/l, sensation intact in face/UE/LE, 5/5 strength UE/LE b/l, patellar reflexes brisk b/l    LABS:             12.8   14.66 )-----------( 171      (  @ 22:00 )             36.3      @ 22:00    129  |  98  |  26  ----------------------------<  83  4.8   |  20  |  1.07    LIVER FUNCTIONS - ( 2018 22:00 )  Alb: 2.2 g/dL / Pro: 4.7 g/dL / ALK PHOS: 194 u/L / ALT: 22 u/L / AST: 45 u/L / GGT: x             A/P: 38yo  now POD1 s/p pLTCS for sPEC, s/p Mg, on Keppra for seizure prophylaxis w/ persistently elevated BP pp   - HELLP labs notable for slight increase in Cr from 0.99->1.07, o/w stable   - transient disorientation resolved after nifedipine 10mg po and resolution of hypertension   - continue w/ procardia xl 30mg po qd   - continue w/ labetalol 400mg po tid   - move to 4T    d/w Dr. Aryan Gibbs MD PGY1

## 2018-01-11 VITALS
OXYGEN SATURATION: 98 % | RESPIRATION RATE: 18 BRPM | SYSTOLIC BLOOD PRESSURE: 128 MMHG | HEART RATE: 87 BPM | TEMPERATURE: 99 F | DIASTOLIC BLOOD PRESSURE: 87 MMHG

## 2018-01-11 RX ORDER — LABETALOL HCL 100 MG
2 TABLET ORAL
Qty: 180 | Refills: 0
Start: 2018-01-11 | End: 2018-02-09

## 2018-01-11 RX ORDER — NIFEDIPINE 30 MG
1 TABLET, EXTENDED RELEASE 24 HR ORAL
Qty: 30 | Refills: 0
Start: 2018-01-11 | End: 2018-02-09

## 2018-01-11 RX ADMIN — Medication 100 MILLIGRAM(S): at 05:40

## 2018-01-11 RX ADMIN — Medication 400 MILLIGRAM(S): at 13:31

## 2018-01-11 RX ADMIN — Medication 975 MILLIGRAM(S): at 05:40

## 2018-01-11 RX ADMIN — HEPARIN SODIUM 5000 UNIT(S): 5000 INJECTION INTRAVENOUS; SUBCUTANEOUS at 05:40

## 2018-01-11 RX ADMIN — Medication 400 MILLIGRAM(S): at 05:50

## 2018-01-11 RX ADMIN — Medication 975 MILLIGRAM(S): at 06:30

## 2018-01-11 NOTE — PROGRESS NOTE ADULT - SUBJECTIVE AND OBJECTIVE BOX
Postpartum Note,  Section  She is a 37y woman w sPEC who is now post-operative day: 1    Subjective:  The patient feels well, no acute complaints.  Not yet ambulating due to magnesium being on, tolerating PO diet, denies nausea/vomiting.  Hunter in place, draining well, and passing gas.  Pain is controlled. Reports normal postpartum bleeding.    This AM, mag level was at 7.5, discontinued at 5:15am.  Will discuss w attending on when and at what level to run it at.    Denies HA, vision changes, RUQ/epigastric pain, SOB.    Physical exam:    Vital Signs Last 24 Hrs  T(C): 36.6 (2018 06:10), Max: 36.8 (2018 04:35)  T(F): 97.9 (2018 06:10), Max: 98.2 (2018 04:35)  HR: 78 (2018 06:10) (76 - 90)  BP: 136/81 (2018 06:10) (127/90 - 174/93)  BP(mean): 108 (2018 02:00) (84 - 112)  RR: 18 (2018 06:10) (11 - 23)  SpO2: 98% (2018 06:10) (80% - 98%)    Gen: NAD  Abdomen: Soft, nontender, no distension , firm uterine fundus at umbilicus.  Incision: Clean, dry, and intact with dermabond  Pelvic: Normal lochia noted  Ext: No calf tenderness    LABS:                        13.9   15.64 )-----------( 205      ( 2018 04:15 )             40.2                         13.9   15.04 )-----------( 191      ( 2018 20:42 )             39.7                         15.2   11.81 )-----------( 204      ( 2018 14:45 )             42.7                         14.9   12.13 )-----------( 193      ( 2018 09:25 )             41.0                         14.8   14.79 )-----------( 195      ( 2018 02:30 )             42.2                         16.1   15.64 )-----------( 247      ( 2018 21:10 )             48.5     Magnesium, Serum: 7.5 mg/dL ( 04:15)  Magnesium, Serum: 4.4 mg/dL ( @ 20:42)  Magnesium, Serum: 7.1 mg/dL ( @ 14:45)  Magnesium, Serum: 7.8 mg/dL ( @ 09:25)    18 @ 04:15      131<L>  |  101  |  19  ----------------------------<  103<H>  4.8   |  19<L>  |  0.99    18 @ 20:42      132<L>  |  100  |  17  ----------------------------<  94  4.9   |  16<L>  |  0.92    18 @ 14:45      133<L>  |  101  |  19  ----------------------------<  102<H>  4.9   |  15<L>  |  1.13    18 @ 09:25      132<L>  |  100  |  18  ----------------------------<  100<H>  4.8   |  17<L>  |  1.07    18 @ 02:56      136  |  105  |  18  ----------------------------<  101<H>  4.8   |  16<L>  |  1.09    18 @ 21:10      136  |  102  |  18  ----------------------------<  84  4.5   |  17<L>  |  1.04        Ca    6.8<L>      2018 04:15  Ca    6.8<L>      2018 20:42  Ca    7.3<L>      2018 14:45  Ca    7.5<L>      2018 09:25  Ca    7.8<L>      2018 02:56  Ca    8.8      2018 21:10  Mg     7.5<HH>       Mg     4.4<H>       Mg     7.1<HH>       Mg     7.8<HH>       Mg     6.1<H>         TPro  4.8<L>  /  Alb  2.2<L>  /  TBili  < 0.2<L>  /  DBili  x   /  AST  39<H>  /  ALT  23  /  AlkPhos  231<H>  18 @ 04:15  TPro  4.3<L>  /  Alb  2.0<L>  /  TBili  < 0.2<L>  /  DBili  x   /  AST  34<H>  /  ALT  24  /  AlkPhos  220<H>  18 @ 20:42  TPro  5.4<L>  /  Alb  2.4<L>  /  TBili  < 0.2<L>  /  DBili  x   /  AST  46<H>  /  ALT  30  /  AlkPhos  289<H>  18 @ 14:45  TPro  5.0<L>  /  Alb  2.4<L>  /  TBili  < 0.2<L>  /  DBili  x   /  AST  45<H>  /  ALT  31  /  AlkPhos  276<H>  18 @ 09:25  TPro  5.2<L>  /  Alb  2.3<L>  /  TBili  < 0.2<L>  /  DBili  x   /  AST  53<H>  /  ALT  32  /  AlkPhos  277<H>  18 @ 02:56  TPro  6.3  /  Alb  2.8<L>  /  TBili  0.2  /  DBili  x   /  AST  57<H>  /  ALT  31  /  AlkPhos  336<H>  18 @ 21:10        Allergies    No Known Allergies    Intolerances      MEDICATIONS  (STANDING):  acetaminophen   Tablet. 975 milliGRAM(s) Oral every 6 hours  diphtheria/tetanus/pertussis (acellular) Vaccine (ADAcel) 0.5 milliLiter(s) IntraMuscular once  ferrous    sulfate 325 milliGRAM(s) Oral daily  heparin  Injectable 5000 Unit(s) SubCutaneous every 12 hours  influenza   Vaccine 0.5 milliLiter(s) IntraMuscular once  ketorolac   Injectable 30 milliGRAM(s) IV Push every 6 hours  labetalol 300 milliGRAM(s) Oral every 8 hours  lactated ringers. 1000 milliLiter(s) (50 mL/Hr) IV Continuous <Continuous>  magnesium sulfate Infusion 2 Gm/Hr (50 mL/Hr) IV Continuous <Continuous>  oxyCODONE    IR 5 milliGRAM(s) Oral every 3 hours  prenatal multivitamin 1 Tablet(s) Oral daily    MEDICATIONS  (PRN):  diphenhydrAMINE   Capsule 25 milliGRAM(s) Oral every 6 hours PRN Itching  docusate sodium 100 milliGRAM(s) Oral two times a day PRN Stool Softening  glycerin Suppository - Adult 1 Suppository(s) Rectal at bedtime PRN Constipation  lanolin Ointment 1 Application(s) Topical every 3 hours PRN Sore Nipples  oxyCODONE    IR 5 milliGRAM(s) Oral every 4 hours PRN Severe Pain (7 - 10)  simethicone 80 milliGRAM(s) Chew every 4 hours PRN Gas  zolpidem 5 milliGRAM(s) Oral at bedtime PRN Insomnia
 Section/Postpartum  BING MARAVILLA is a  37y woman G 1  P 1      who is now post-operative day: 1  PAST MEDICAL & SURGICAL HISTORY:    Subjective:  The patient feels well.  She is ambulating.   She is tolerating regular diet.  She denies nausea and vomiting.  She is voiding.  Her pain is controlled.  She reports normal postpartum bleeding.  She is breastfeeding.  She is formula feeding.    Physical exam:    Vital Signs Last 24 Hrs  T(C): 36.5 (2018 14:00), Max: 36.9 (2018 10:00)  T(F): 97.7 (2018 14:00), Max: 98.4 (2018 10:00)  HR: 74 (2018 14:00) (71 - 90)  BP: 143/95 (2018 14:00) (127/90 - 174/93)  BP(mean): 108 (2018 02:00) (84 - 112)  RR: 17 (2018 14:00) (11 - 23)  SpO2: 100% (2018 14:00) (80% - 100%)    General: alert and oriented in no acute distress.  Breast: Soft, nontender, not engorged.  Abdomen: Soft, nontender, not distended ,  Uterine fundus is firm and below the umbilicus, BS (+), Flatus (+), Bowel Movement (+)  Incision: Clean, dry, and intact, bandage has been removed  Pelvic: Normal lochia noted  Ext: No calf tenderness  Lochia: not excessive      LABS:                        13.9   15.64 )-----------( 205      ( 2018 04:15 )             40.2       Rubella status:  Immune    Blood Type: ABO Interpretation: A (18 @ 21:02)                     Allergies    No Known Allergies    Intolerances      MEDICATIONS  (STANDING):  acetaminophen   Tablet. 975 milliGRAM(s) Oral every 6 hours  diphtheria/tetanus/pertussis (acellular) Vaccine (ADAcel) 0.5 milliLiter(s) IntraMuscular once  ferrous    sulfate 325 milliGRAM(s) Oral daily  heparin  Injectable 5000 Unit(s) SubCutaneous every 12 hours  influenza   Vaccine 0.5 milliLiter(s) IntraMuscular once  ketorolac   Injectable 30 milliGRAM(s) IV Push every 6 hours  labetalol 300 milliGRAM(s) Oral every 8 hours  lactated ringers. 1000 milliLiter(s) (125 mL/Hr) IV Continuous <Continuous>  levETIRAcetam 500 milliGRAM(s) Oral two times a day  oxyCODONE    IR 5 milliGRAM(s) Oral every 3 hours  prenatal multivitamin 1 Tablet(s) Oral daily    MEDICATIONS  (PRN):  diphenhydrAMINE   Capsule 25 milliGRAM(s) Oral every 6 hours PRN Itching  docusate sodium 100 milliGRAM(s) Oral two times a day PRN Stool Softening  glycerin Suppository - Adult 1 Suppository(s) Rectal at bedtime PRN Constipation  lanolin Ointment 1 Application(s) Topical every 3 hours PRN Sore Nipples  oxyCODONE    IR 5 milliGRAM(s) Oral every 4 hours PRN Severe Pain (7 - 10)  simethicone 80 milliGRAM(s) Chew every 4 hours PRN Gas  zolpidem 5 milliGRAM(s) Oral at bedtime PRN Insomnia        Assessment and Plan  POD # 1   s/p PCS    Doing well.  Encourage ambulation, may shower, routine postop care  Circumcision today.
OB Postpartum Note: Primary  Delivery, POD#3    S: 36yo  POD#3 s/p pLTCS. Intrapartum course c/b sPEC, s/p mg, s/p Keppra. Currently on labetalol 400 TID and procardia 30.  The patient feels well.  Pain is well controlled. She is tolerating a regular diet and passing flatus. She is voiding spontaneously, and ambulating without difficulty. Denies CP/SOB. Denies HA. changes in vision lightheadedness/dizziness. Denies N/V.    O:  Vitals:  Vital Signs Last 24 Hrs  T(C): 37.1 (2018 05:40), Max: 37.1 (2018 05:40)  T(F): 98.7 (2018 05:40), Max: 98.7 (2018 05:40)  HR: 95 (2018 05:40) (85 - 95)  BP: 131/82 (2018 05:40) (116/76 - 135/97)  BP(mean): --  RR: 18 (2018 05:40) (16 - 18)  SpO2: 99% (2018 05:40) (96% - 99%)    MEDICATIONS  (STANDING):  acetaminophen   Tablet. 975 milliGRAM(s) Oral every 6 hours  diphtheria/tetanus/pertussis (acellular) Vaccine (ADAcel) 0.5 milliLiter(s) IntraMuscular once  ferrous    sulfate 325 milliGRAM(s) Oral daily  heparin  Injectable 5000 Unit(s) SubCutaneous every 12 hours  influenza   Vaccine 0.5 milliLiter(s) IntraMuscular once  labetalol 400 milliGRAM(s) Oral three times a day  NIFEdipine XL 30 milliGRAM(s) Oral daily  oxyCODONE    IR 5 milliGRAM(s) Oral every 3 hours  prenatal multivitamin 1 Tablet(s) Oral daily    MEDICATIONS  (PRN):  diphenhydrAMINE   Capsule 25 milliGRAM(s) Oral every 6 hours PRN Itching  docusate sodium 100 milliGRAM(s) Oral two times a day PRN Stool Softening  glycerin Suppository - Adult 1 Suppository(s) Rectal at bedtime PRN Constipation  lanolin Ointment 1 Application(s) Topical every 3 hours PRN Sore Nipples  oxyCODONE    IR 5 milliGRAM(s) Oral every 4 hours PRN Severe Pain (7 - 10)  simethicone 80 milliGRAM(s) Chew every 4 hours PRN Gas  zolpidem 5 milliGRAM(s) Oral at bedtime PRN Insomnia      LABS:  Blood type: A Positive  Rubella IgG: RPR: Negative                          12.8   14.66<H> >-----------< 171    (  22:00 )             36.3                        13.9   15.64<H> >-----------< 205    (  04:15 )             40.2                        13.9   15.04<H> >-----------< 191    (  20:42 )             39.7                        15.2   11.81<H> >-----------< 204    (  14:45 )             42.7                        14.9   12.13<H> >-----------< 193    (  09:25 )             41.0    18 @ 22:00      129<L>  |  98  |  26<H>  ----------------------------<  83  4.8   |  20<L>  |  1.07    18 04:15      131<L>  |  101  |  19  ----------------------------<  103<H>  4.8   |  19<L>  |  0.99    18 20:42      132<L>  |  100  |  17  ----------------------------<  94  4.9   |  16<L>  |  0.92    18 14:45      133<L>  |  101  |  19  ----------------------------<  102<H>  4.9   |  15<L>  |  1.13    18 09:25      132<L>  |  100  |  18  ----------------------------<  100<H>  4.8   |  17<L>  |  1.07        Ca    6.7<L>      2018 22:00  Ca    6.8<L>      2018 04:15  Ca    6.8<L>      2018 20:42  Ca    7.3<L>      2018 14:45  Ca    7.5<L>      2018 09:25  Mg     7.5<HH>       Mg     4.4<H>       Mg     7.1<HH>       Mg     7.8<HH>         TPro  4.7<L>  /  Alb  2.2<L>  /  TBili  < 0.2<L>  /  DBili  x   /  AST  45<H>  /  ALT  22  /  AlkPhos  194<H>  18 @ 22:00  TPro  4.8<L>  /  Alb  2.2<L>  /  TBili  < 0.2<L>  /  DBili  x   /  AST  39<H>  /  ALT  23  /  AlkPhos  231<H>  18 @ 04:15  TPro  4.3<L>  /  Alb  2.0<L>  /  TBili  < 0.2<L>  /  DBili  x   /  AST  34<H>  /  ALT  24  /  AlkPhos  220<H>  18 @ 20:42  TPro  5.4<L>  /  Alb  2.4<L>  /  TBili  < 0.2<L>  /  DBili  x   /  AST  46<H>  /  ALT  30  /  AlkPhos  289<H>  18 @ 14:45  TPro  5.0<L>  /  Alb  2.4<L>  /  TBili  < 0.2<L>  /  DBili  x   /  AST  45<H>  /  ALT  31  /  AlkPhos  276<H>  18 @ 09:25          Physical exam:  Gen: NAD  Abdomen: Soft, nontender, no distension , firm uterine fundus at umbilicus.  Incision: Clean, dry, and intact   Pelvic: Normal lochia noted  Ext: No calf tenderness
Patient is 37y  old.    Event : /104 -160/92    Vital Signs Last 24 Hrs  T(C): 36.5 (09 Jan 2018 21:25), Max: 36.9 (09 Jan 2018 10:00)  T(F): 97.7 (09 Jan 2018 21:25), Max: 98.4 (09 Jan 2018 10:00)  HR: 85 (09 Jan 2018 21:25) (71 - 90)  BP: 160/92 (09 Jan 2018 21:32) (120/80 - 160/92)  BP(mean): 108 (09 Jan 2018 02:00) (84 - 108)  RR: 17 (09 Jan 2018 21:25) (12 - 23)  SpO2: 97% (09 Jan 2018 21:25) (92% - 100%)    I&O's Detail    08 Jan 2018 07:01  -  09 Jan 2018 07:00  --------------------------------------------------------  IN:    Lactated Ringers IV Bolus: 1000 mL    lactated ringers.: 650 mL    lactated ringers.: 100 mL    magnesium sulfate  Infusion: 150 mL    magnesium sulfate  Infusion: 100 mL    magnesium sulfate  Infusion: 150 mL    Other: 1500 mL  Total IN: 3650 mL    OUT:    Estimated Blood Loss: 500 mL    Indwelling Catheter - Urethral: 1230 mL  Total OUT: 1730 mL    Total NET: 1920 mL      09 Jan 2018 07:01  -  09 Jan 2018 22:43  --------------------------------------------------------  IN:    lactated ringers.: 1250 mL  Total IN: 1250 mL    OUT:    Indwelling Catheter - Urethral: 650 mL    Voided: 450 mL  Total OUT: 1100 mL    Total NET: 150 mL          Labs:                        12.8   14.66 )-----------( 171      ( 09 Jan 2018 22:00 )             36.3                         13.9   15.64 )-----------( 205      ( 09 Jan 2018 04:15 )             40.2                         13.9   15.04 )-----------( 191      ( 08 Jan 2018 20:42 )             39.7                         15.2   11.81 )-----------( 204      ( 08 Jan 2018 14:45 )             42.7                         14.9   12.13 )-----------( 193      ( 08 Jan 2018 09:25 )             41.0                         14.8   14.79 )-----------( 195      ( 08 Jan 2018 02:30 )             42.2         PT/INR - ( 09 Jan 2018 04:15 )   PT: 8.9 SEC;   INR: 0.78          PTT - ( 09 Jan 2018 04:15 )  PTT:28.3 SEC    Fibrinogen: Fibrinogen Assay: 585.8 mg/dL (01-09 @ 04:15)      Lactate:       Evaluation :s/p c/section and s/p Mag on labetalol 300tid and on keppra  no c/o HA, Blurred vision, epigastric pain or any other discomforts  /104 repeat 160/92 Labetalol 300mg given which was due  repeat BP 1hr after the labetalol 160/104 -160/98  Extrimities 2+ edema  DTR WNL  Management and Follow-up plan :  HEL labs sent  discussed with Dr. Levi  Labetalol 20mg IVP and 100mg PO  Dr. Gibbs in to see patient also              -------------------------------------------------------------------------------------------------------------
Postpartum Note,  Section  She is a 37y woman w sPEC, s/p Mg, s/p Keppra, who is now post-operative day: 2    Subjective:  The patient feels well, no acute complaints.  Ambulating, tolerating PO diet, denies nausea/vomiting.  Voiding spontaneously and passing gas.  Pain is controlled. Reports normal postpartum bleeding.    Denies HA, vision changes, RUQ/epigastric pain, SOB.    Physical exam:    Vital Signs Last 24 Hrs  T(C): 36.8 (10 Nick 2018 05:40), Max: 36.9 (2018 10:00)  T(F): 98.2 (10 Nick 2018 05:40), Max: 98.4 (2018 10:00)  HR: 74 (10 Nick 2018 05:40) (71 - 88)  BP: 117/68 (10 Nick 2018 05:40) (95/62 - 164/108)  BP(mean): --  RR: 16 (10 Nick 2018 05:40) (16 - 18)  SpO2: 96% (10 Nick 2018 05:40) (96% - 100%)    Gen: NAD  Abdomen: Soft, nontender, no distension , firm uterine fundus at umbilicus.  Incision: Clean, dry, and intact with steri strips  Pelvic: Normal lochia noted  Ext: No calf tenderness, moving all 4 extremities spontaneously  Psych: alert and oriented to person, place, and time    LABS:                        12.8   14.66 )-----------( 171      ( 2018 22:00 )             36.3                         13.9   15.64 )-----------( 205      ( 2018 04:15 )             40.2                         13.9   15.04 )-----------( 191      ( 2018 20:42 )             39.7                         15.2   11.81 )-----------( 204      ( 2018 14:45 )             42.7                         14.9   12.13 )-----------( 193      ( 2018 09:25 )             41.0       18 @ 22:00      129<L>  |  98  |  26<H>  ----------------------------<  83  4.8   |  20<L>  |  1.07    18 @ 04:15      131<L>  |  101  |  19  ----------------------------<  103<H>  4.8   |  19<L>  |  0.99    18 @ 20:42      132<L>  |  100  |  17  ----------------------------<  94  4.9   |  16<L>  |  0.92    18 @ 14:45      133<L>  |  101  |  19  ----------------------------<  102<H>  4.9   |  15<L>  |  1.13    18 @ 09:25      132<L>  |  100  |  18  ----------------------------<  100<H>  4.8   |  17<L>  |  1.07    18 @ 02:56      136  |  105  |  18  ----------------------------<  101<H>  4.8   |  16<L>  |  1.09    18 @ 21:10      136  |  102  |  18  ----------------------------<  84  4.5   |  17<L>  |  1.04        Ca    6.7<L>      2018 22:00  Ca    6.8<L>      2018 04:15  Ca    6.8<L>      2018 20:42  Ca    7.3<L>      2018 14:45  Ca    7.5<L>      2018 09:25  Ca    7.8<L>      2018 02:56  Ca    8.8      2018 21:10  Mg     7.5<HH>       Mg     4.4<H>       Mg     7.1<HH>       Mg     7.8<HH>       Mg     6.1<H>         TPro  4.7<L>  /  Alb  2.2<L>  /  TBili  < 0.2<L>  /  DBili  x   /  AST  45<H>  /  ALT  22  /  AlkPhos  194<H>  18 @ 22:00  TPro  4.8<L>  /  Alb  2.2<L>  /  TBili  < 0.2<L>  /  DBili  x   /  AST  39<H>  /  ALT  23  /  AlkPhos  231<H>  18 @ 04:15  TPro  4.3<L>  /  Alb  2.0<L>  /  TBili  < 0.2<L>  /  DBili  x   /  AST  34<H>  /  ALT  24  /  AlkPhos  220<H>  18 @ 20:42  TPro  5.4<L>  /  Alb  2.4<L>  /  TBili  < 0.2<L>  /  DBili  x   /  AST  46<H>  /  ALT  30  /  AlkPhos  289<H>  18 @ 14:45  TPro  5.0<L>  /  Alb  2.4<L>  /  TBili  < 0.2<L>  /  DBili  x   /  AST  45<H>  /  ALT  31  /  AlkPhos  276<H>  18 @ 09:25  TPro  5.2<L>  /  Alb  2.3<L>  /  TBili  < 0.2<L>  /  DBili  x   /  AST  53<H>  /  ALT  32  /  AlkPhos  277<H>  18 @ 02:56  TPro  6.3  /  Alb  2.8<L>  /  TBili  0.2  /  DBili  x   /  AST  57<H>  /  ALT  31  /  AlkPhos  336<H>  18 @ 21:10        Allergies    No Known Allergies    Intolerances      MEDICATIONS  (STANDING):  acetaminophen   Tablet. 975 milliGRAM(s) Oral every 6 hours  diphtheria/tetanus/pertussis (acellular) Vaccine (ADAcel) 0.5 milliLiter(s) IntraMuscular once  ferrous    sulfate 325 milliGRAM(s) Oral daily  heparin  Injectable 5000 Unit(s) SubCutaneous every 12 hours  influenza   Vaccine 0.5 milliLiter(s) IntraMuscular once  labetalol 400 milliGRAM(s) Oral three times a day  NIFEdipine XL 30 milliGRAM(s) Oral daily  oxyCODONE    IR 5 milliGRAM(s) Oral every 3 hours  prenatal multivitamin 1 Tablet(s) Oral daily    MEDICATIONS  (PRN):  diphenhydrAMINE   Capsule 25 milliGRAM(s) Oral every 6 hours PRN Itching  docusate sodium 100 milliGRAM(s) Oral two times a day PRN Stool Softening  glycerin Suppository - Adult 1 Suppository(s) Rectal at bedtime PRN Constipation  lanolin Ointment 1 Application(s) Topical every 3 hours PRN Sore Nipples  oxyCODONE    IR 5 milliGRAM(s) Oral every 4 hours PRN Severe Pain (7 - 10)  simethicone 80 milliGRAM(s) Chew every 4 hours PRN Gas  zolpidem 5 milliGRAM(s) Oral at bedtime PRN Insomnia

## 2018-01-11 NOTE — PROGRESS NOTE ADULT - PROBLEM SELECTOR PLAN 1
- Continue labetalol and procardia  - Monitor VS.  - Continue motrin, tylenol, oxycodone PRN for pain control.  - Increase ambulation  - Continue regular diet  - Discharge planning    Fatmata Wilkinson PGY-1

## 2018-01-11 NOTE — PROGRESS NOTE ADULT - ASSESSMENT
A/P: 38yo  POD#3 s/p pLTCS. Intrapartum course c/b sPEC, s/p mg, s/p Keppra.  BPs stable and is doing well post-operatively.

## 2018-01-20 LAB — SURGICAL PATHOLOGY STUDY: SIGNIFICANT CHANGE UP

## 2019-08-06 ENCOUNTER — INPATIENT (INPATIENT)
Facility: HOSPITAL | Age: 39
LOS: 1 days | Discharge: HOME | End: 2019-08-08
Attending: OBSTETRICS & GYNECOLOGY | Admitting: OBSTETRICS & GYNECOLOGY

## 2019-08-06 VITALS
TEMPERATURE: 98 F | RESPIRATION RATE: 16 BRPM | SYSTOLIC BLOOD PRESSURE: 141 MMHG | HEART RATE: 114 BPM | DIASTOLIC BLOOD PRESSURE: 93 MMHG

## 2019-08-06 DIAGNOSIS — Z98.891 HISTORY OF UTERINE SCAR FROM PREVIOUS SURGERY: Chronic | ICD-10-CM

## 2019-08-06 DIAGNOSIS — O26.899 OTHER SPECIFIED PREGNANCY RELATED CONDITIONS, UNSPECIFIED TRIMESTER: ICD-10-CM

## 2019-08-06 DIAGNOSIS — Z3A.00 WEEKS OF GESTATION OF PREGNANCY NOT SPECIFIED: ICD-10-CM

## 2019-08-06 DIAGNOSIS — I10 ESSENTIAL (PRIMARY) HYPERTENSION: ICD-10-CM

## 2019-08-06 LAB
ALBUMIN SERPL ELPH-MCNC: 3.6 G/DL — SIGNIFICANT CHANGE UP (ref 3.3–5)
ALP SERPL-CCNC: 183 U/L — HIGH (ref 40–120)
ALT FLD-CCNC: 12 U/L — SIGNIFICANT CHANGE UP (ref 4–33)
ANION GAP SERPL CALC-SCNC: 14 MMO/L — SIGNIFICANT CHANGE UP (ref 7–14)
APPEARANCE UR: CLEAR — SIGNIFICANT CHANGE UP
APTT BLD: 27.2 SEC — LOW (ref 27.5–36.3)
AST SERPL-CCNC: 18 U/L — SIGNIFICANT CHANGE UP (ref 4–32)
BACTERIA # UR AUTO: NEGATIVE — SIGNIFICANT CHANGE UP
BASOPHILS # BLD AUTO: 0.05 K/UL — SIGNIFICANT CHANGE UP (ref 0–0.2)
BASOPHILS NFR BLD AUTO: 0.4 % — SIGNIFICANT CHANGE UP (ref 0–2)
BILIRUB SERPL-MCNC: < 0.2 MG/DL — LOW (ref 0.2–1.2)
BILIRUB UR-MCNC: NEGATIVE — SIGNIFICANT CHANGE UP
BLOOD UR QL VISUAL: NEGATIVE — SIGNIFICANT CHANGE UP
BUN SERPL-MCNC: 9 MG/DL — SIGNIFICANT CHANGE UP (ref 7–23)
CALCIUM SERPL-MCNC: 9.7 MG/DL — SIGNIFICANT CHANGE UP (ref 8.4–10.5)
CHLORIDE SERPL-SCNC: 103 MMOL/L — SIGNIFICANT CHANGE UP (ref 98–107)
CO2 SERPL-SCNC: 20 MMOL/L — LOW (ref 22–31)
COLOR SPEC: YELLOW — SIGNIFICANT CHANGE UP
CREAT ?TM UR-MCNC: 91 MG/DL — SIGNIFICANT CHANGE UP
CREAT SERPL-MCNC: 0.66 MG/DL — SIGNIFICANT CHANGE UP (ref 0.5–1.3)
EOSINOPHIL # BLD AUTO: 0.07 K/UL — SIGNIFICANT CHANGE UP (ref 0–0.5)
EOSINOPHIL NFR BLD AUTO: 0.6 % — SIGNIFICANT CHANGE UP (ref 0–6)
FIBRINOGEN PPP-MCNC: 722 MG/DL — HIGH (ref 350–510)
GLUCOSE SERPL-MCNC: 81 MG/DL — SIGNIFICANT CHANGE UP (ref 70–99)
GLUCOSE UR-MCNC: NEGATIVE — SIGNIFICANT CHANGE UP
HCT VFR BLD CALC: 40.1 % — SIGNIFICANT CHANGE UP (ref 34.5–45)
HGB BLD-MCNC: 13.6 G/DL — SIGNIFICANT CHANGE UP (ref 11.5–15.5)
HYALINE CASTS # UR AUTO: NEGATIVE — SIGNIFICANT CHANGE UP
IMM GRANULOCYTES NFR BLD AUTO: 0.7 % — SIGNIFICANT CHANGE UP (ref 0–1.5)
INR BLD: 0.89 — SIGNIFICANT CHANGE UP (ref 0.88–1.17)
KETONES UR-MCNC: HIGH
LDH SERPL L TO P-CCNC: 213 U/L — SIGNIFICANT CHANGE UP (ref 135–225)
LEUKOCYTE ESTERASE UR-ACNC: NEGATIVE — SIGNIFICANT CHANGE UP
LYMPHOCYTES # BLD AUTO: 1.65 K/UL — SIGNIFICANT CHANGE UP (ref 1–3.3)
LYMPHOCYTES # BLD AUTO: 13.6 % — SIGNIFICANT CHANGE UP (ref 13–44)
MCHC RBC-ENTMCNC: 30.7 PG — SIGNIFICANT CHANGE UP (ref 27–34)
MCHC RBC-ENTMCNC: 33.9 % — SIGNIFICANT CHANGE UP (ref 32–36)
MCV RBC AUTO: 90.5 FL — SIGNIFICANT CHANGE UP (ref 80–100)
MONOCYTES # BLD AUTO: 0.79 K/UL — SIGNIFICANT CHANGE UP (ref 0–0.9)
MONOCYTES NFR BLD AUTO: 6.5 % — SIGNIFICANT CHANGE UP (ref 2–14)
NEUTROPHILS # BLD AUTO: 9.51 K/UL — HIGH (ref 1.8–7.4)
NEUTROPHILS NFR BLD AUTO: 78.2 % — HIGH (ref 43–77)
NITRITE UR-MCNC: NEGATIVE — SIGNIFICANT CHANGE UP
NRBC # FLD: 0 K/UL — SIGNIFICANT CHANGE UP (ref 0–0)
PH UR: 6 — SIGNIFICANT CHANGE UP (ref 5–8)
PLATELET # BLD AUTO: 177 K/UL — SIGNIFICANT CHANGE UP (ref 150–400)
PMV BLD: 13.2 FL — HIGH (ref 7–13)
POTASSIUM SERPL-MCNC: 4.1 MMOL/L — SIGNIFICANT CHANGE UP (ref 3.5–5.3)
POTASSIUM SERPL-SCNC: 4.1 MMOL/L — SIGNIFICANT CHANGE UP (ref 3.5–5.3)
PROT SERPL-MCNC: 6.9 G/DL — SIGNIFICANT CHANGE UP (ref 6–8.3)
PROT UR-MCNC: 30 — SIGNIFICANT CHANGE UP
PROT UR-MCNC: 46.1 MG/DL — SIGNIFICANT CHANGE UP
PROTHROM AB SERPL-ACNC: 9.8 SEC — SIGNIFICANT CHANGE UP (ref 9.8–13.1)
RBC # BLD: 4.43 M/UL — SIGNIFICANT CHANGE UP (ref 3.8–5.2)
RBC # FLD: 13 % — SIGNIFICANT CHANGE UP (ref 10.3–14.5)
RBC CASTS # UR COMP ASSIST: SIGNIFICANT CHANGE UP (ref 0–?)
SODIUM SERPL-SCNC: 137 MMOL/L — SIGNIFICANT CHANGE UP (ref 135–145)
SP GR SPEC: 1.02 — SIGNIFICANT CHANGE UP (ref 1–1.04)
SQUAMOUS # UR AUTO: SIGNIFICANT CHANGE UP
URATE SERPL-MCNC: 4.4 MG/DL — SIGNIFICANT CHANGE UP (ref 2.5–7)
UROBILINOGEN FLD QL: NORMAL — SIGNIFICANT CHANGE UP
WBC # BLD: 12.16 K/UL — HIGH (ref 3.8–10.5)
WBC # FLD AUTO: 12.16 K/UL — HIGH (ref 3.8–10.5)
WBC UR QL: SIGNIFICANT CHANGE UP (ref 0–?)

## 2019-08-06 NOTE — OB PROVIDER TRIAGE NOTE - NSHPPHYSICALEXAM_GEN_ALL_CORE
VSS  Abdomen: Soft, non-tender on palpation  NST/[place] in progress  HELLP Labs pending  SVE VSS  Bp: 141/93, 135/88, 123/85, 121/81  Abdomen: Soft, non-tender on palpation  NST/[place] in progress  HELLP Labs pending    Bp Range:117-128/75-90  NST:  with moderate variability, accelerations, no decelerations  [place]: Irregular ctx noted on toco

## 2019-08-06 NOTE — OB PROVIDER H&P - NSHPLABSRESULTS_GEN_ALL_CORE
[facility] Labs Pending                        13.6   12.16 )-----------( 177      ( 06 Aug 2019 20:15 )             40.1   08-    137  |  103  |  9   ----------------------------<  81  4.1   |  20<L>  |  0.66  Uric Acid:4.4  Lactate Dehydrogenase:213  PT:98  INR:0.89  PTT:27.2  Fibrinogen Assay:722.0  Ca    9.7      06 Aug 2019 20:15  TPro  6.9  /  Alb  3.6  /  TBili  < 0.2<L>  /  DBili  x   /  AST  18  /  ALT  12  /  AlkPhos  183<H>    Urinalysis Basic - ( 06 Aug 2019 20:46 )  Color: YELLOW / Appearance: CLEAR / S.018 / pH: 6.0  Gluc: NEGATIVE / Ketone: MODERATE  / Bili: NEGATIVE / Urobili: NORMAL   Blood: NEGATIVE / Protein: 30 / Nitrite: NEGATIVE   Leuk Esterase: NEGATIVE / RBC: 0-2 / WBC 0-2   Sq Epi: FEW / Non Sq Epi: x / Bacteria: NEGATIVE  PCR:0.5 [facility] Labs:                        13.6   12.16 )-----------( 177      ( 06 Aug 2019 20:15 )             40.1   08-    137  |  103  |  9   ----------------------------<  81  4.1   |  20<L>  |  0.66  Uric Acid:4.4  Lactate Dehydrogenase:213  PT:98  INR:0.89  PTT:27.2  Fibrinogen Assay:722.0  Ca    9.7      06 Aug 2019 20:15  TPro  6.9  /  Alb  3.6  /  TBili  < 0.2<L>  /  DBili  x   /  AST  18  /  ALT  12  /  AlkPhos  183<H>    Urinalysis Basic - ( 06 Aug 2019 20:46 )  Color: YELLOW / Appearance: CLEAR / S.018 / pH: 6.0  Gluc: NEGATIVE / Ketone: MODERATE  / Bili: NEGATIVE / Urobili: NORMAL   Blood: NEGATIVE / Protein: 30 / Nitrite: NEGATIVE   Leuk Esterase: NEGATIVE / RBC: 0-2 / WBC 0-2   Sq Epi: FEW / Non Sq Epi: x / Bacteria: NEGATIVE  PCR:0.5

## 2019-08-06 NOTE — OB PROVIDER TRIAGE NOTE - NSHPLABSRESULTS_GEN_ALL_CORE
[facility] Labs Pending                        13.6   12.16 )-----------( 177      ( 06 Aug 2019 20:15 )             40.1   08-    137  |  103  |  9   ----------------------------<  81  4.1   |  20<L>  |  0.66  Uric Acid:4.4  Lactate Dehydrogenase:213  PT:98  INR:0.89  PTT:27.2  Fibrinogen Assay:722.0  Ca    9.7      06 Aug 2019 20:15  TPro  6.9  /  Alb  3.6  /  TBili  < 0.2<L>  /  DBili  x   /  AST  18  /  ALT  12  /  AlkPhos  183<H>    Urinalysis Basic - ( 06 Aug 2019 20:46 )  Color: YELLOW / Appearance: CLEAR / S.018 / pH: 6.0  Gluc: NEGATIVE / Ketone: MODERATE  / Bili: NEGATIVE / Urobili: NORMAL   Blood: NEGATIVE / Protein: 30 / Nitrite: NEGATIVE   Leuk Esterase: NEGATIVE / RBC: 0-2 / WBC 0-2   Sq Epi: FEW / Non Sq Epi: x / Bacteria: NEGATIVE  PCR:0.5

## 2019-08-06 NOTE — OB PROVIDER H&P - ASSESSMENT
Pt of Dr. Colon 40 y/o  @35.1 weeks gestation presents to triage  with c/o elevated blood pressure. Pt states she had a elevated blood pressure in the Ob office yesterday 133/93. At which time she was told to monitor her blood pressure at home. Pt states her blood pressure at work was 150/110 today. Pt denies headache, epigastric pain and visual disturbances. Pt states she was to start a 24 hour urine collection tomorrow. Pt c/o abdominal fullness. Pt c/o mild, irregular ctx. Pt denies Lof, Vb. Pt reports good fetal movement. Pt is scheduled for repeat , pt desires to TOLAC.  Current AP course uncomplicated  Medical H/x: Denies  Surgical H/x:  x1  Ob/Gyn H/x: 2018// Failed IOL secondary to PEC/5Lbs 9oz  Psych H/x: Denies  Meds: Pnv, Vitamin D2  NKDA      VSS  Bp: 141/93, 135/88, 123/85, 121/81  Abdomen: Soft, non-tender on palpation  NST/[place] in progress  HELLP Labs pending    Bp Range:117-128/75-90  NST:  with moderate variability, accelerations, no decelerations  [place]: Irregular ctx noted on toco, felt by patient   SVE:0/-3    Discussed with Dr. Pemberton and Dr. Samuel:   -Admit to labor and delivery for betamethasone, Bp monitoring, 24 hour urine protein collection   -GBS Pending  -Routine Labs ordered Pt of Dr. Colon 40 y/o  @35.1 weeks gestation presents to triage  with c/o elevated blood pressure. Pt states she had a elevated blood pressure in the Ob office yesterday 133/93. At which time she was told to monitor her blood pressure at home. Pt states her blood pressure at work was 150/110 today. Pt denies headache, epigastric pain and visual disturbances. Pt states she was to start a 24 hour urine collection tomorrow. Pt c/o abdominal fullness. Pt c/o mild, irregular ctx. Pt denies Lof, Vb. Pt reports good fetal movement. Pt is scheduled for repeat , pt desires to TOLAC.  Current AP course uncomplicated  Medical H/x: Denies  Surgical H/x:  x1  Ob/Gyn H/x: 2018// Failed IOL secondary to PEC/5Lbs 9oz  Psych H/x: Denies  Meds: Pnv, Vitamin D2  NKDA      VSS  Bp: 141/93, 135/88, 123/85, 121/81  Abdomen: Soft, non-tender on palpation  NST/[place] in progress  HELLP Labs pending    Bp Range:117-128/75-90  NST:  with moderate variability, accelerations, no decelerations  [place]: Irregular ctx noted on toco, felt by patient   SVE:0/-3    Discussed with Dr. Pemberton and Dr. Samuel:   -Admit to labor and delivery for  betamethasone, Bp monitoring, 24 hour urine protein collection ,repeat HELLP Labs   -GBS Pending  -Routine Labs ordered

## 2019-08-06 NOTE — OB PROVIDER H&P - HISTORY OF PRESENT ILLNESS
Pt of Dr. Colon 40 y/o  @35.1 weeks gestation presents to triage  with c/o elevated blood pressure. Pt states she had a elevated blood pressure in the Ob office yesterday 133/93. At which time she was told to monitor her blood pressure at home. Pt states her blood pressure at work was 150/110 today. Pt denies headache, epigastric pain and visual disturbances. Pt states she was to start a 24 hour urine collection tomorrow. Pt c/o abdominal fullness. Pt c/o mild, irregular ctx. Pt denies Lof, Vb. Pt reports good fetal movement. Pt is scheduled for repeat , pt desires to TOLAC.  Current AP course uncomplicated  Medical H/x: Denies  Surgical H/x:  x1  Ob/Gyn H/x: 2018// Failed IOL secondary to PEC  Psych H/x: Denies  Meds: Pnv, Vitamin D2  NKDA Pt of Dr. Colon 40 y/o  @35.1 weeks gestation presents to triage  with c/o elevated blood pressure. Pt states she had a elevated blood pressure in the Ob office yesterday 133/93. At which time she was told to monitor her blood pressure at home. Pt states her blood pressure at work was 150/110 today. Pt denies headache, epigastric pain and visual disturbances. Pt states she was to start a 24 hour urine collection tomorrow. Pt c/o abdominal fullness. Pt c/o mild, irregular ctx. Pt denies Lof, Vb. Pt reports good fetal movement. Pt is scheduled for repeat , pt desires to TOLAC.  Current AP course uncomplicated  Medical H/x: Denies  Surgical H/x:  x1  Ob/Gyn H/x: 2018// Failed IOL / PEC on Magnesium /5Lbs 9oz  Psych H/x: Denies  Meds: Pnv, Vitamin D2  NKDA

## 2019-08-06 NOTE — OB RN TRIAGE NOTE - CHIEF COMPLAINT QUOTE
Elevated BP @ home 150/115 , denies headache , blurry vision and epigastric pain. Previous C/S 2018 with PEC

## 2019-08-06 NOTE — OB PROVIDER H&P - NSHPPHYSICALEXAM_GEN_ALL_CORE
Pt of Dr. Colon 38 y/o  @35.1 weeks gestation presents to triage  with c/o elevated blood pressure. Pt states she had a elevated blood pressure in the Ob office yesterday 133/93. At which time she was told to monitor her blood pressure at home. Pt states her blood pressure at work was 150/110 today. Pt denies headache, epigastric pain and visual disturbances. Pt states she was to start a 24 hour urine collection tomorrow. Pt c/o abdominal fullness. Pt c/o mild, irregular ctx. Pt denies Lof, Vb. Pt reports good fetal movement. Pt is scheduled for repeat , pt desires to TOLAC.  Current AP course uncomplicated  Medical H/x: Denies  Surgical H/x:  x1  Ob/Gyn H/x: 2018// Failed IOL secondary to PEC/5Lbs 9oz  Psych H/x: Denies  Meds: Pnv, Vitamin D2  NKDA Pt of Dr. Colon 38 y/o  @35.1 weeks gestation presents to triage  with c/o elevated blood pressure. Pt states she had a elevated blood pressure in the Ob office yesterday 133/93. At which time she was told to monitor her blood pressure at home. Pt states her blood pressure at work was 150/110 today. Pt denies headache, epigastric pain and visual disturbances. Pt states she was to start a 24 hour urine collection tomorrow. Pt c/o abdominal fullness. Pt c/o mild, irregular ctx. Pt denies Lof, Vb. Pt reports good fetal movement. Pt is scheduled for repeat , pt desires to TOLAC.  Current AP course uncomplicated  Medical H/x: Denies  Surgical H/x:  x1  Ob/Gyn H/x: 2018// Failed IOL / PEC on magnesium /5Lbs 9oz  Psych H/x: Denies  Meds: Pnv, Vitamin D2  NKDA VSS  Bp: 141/93, 135/88, 123/85, 121/81  Abdomen: Soft, non-tender on palpation  NST/[place] in progress  HELLP Labs pending    Bp Range:117-128/75-90  NST:  with moderate variability, accelerations, no decelerations  [place]: Irregular ctx noted on toco, felt by patient   SVE:0/30/-3

## 2019-08-06 NOTE — OB PROVIDER TRIAGE NOTE - HISTORY OF PRESENT ILLNESS
Pt of Dr. Colon 38 y/o  @35.1 weeks gestation presents to triage  with c/o elevated blood pressure. Pt states she had a elevated blood pressure in the Ob office yesterday 133/93. At which time she was told to monitor her blood pressure at home. Pt states her blood pressure at work was 150/110. Pt denies headache, epigastric pain and visual disturbances. Pt states was to start a 24 hour collection tomorrow Pt c/o abdominal fullness. Pt c/o mild, irregular ctx. Pt denies Lof, Vb. Pt reports good fetal movement. Pt is scheduled for repeat , pt desires to TOLAC.  Current AP course uncomplicated  Medical H/x: Denies  Surgical H/x:  x1  Ob/Gyn H/x: 2018// Failed IOL secondary to PEC  Psych H/x: Denies  Meds: Pnv, Vitamin D2  NKDA Pt of Dr. Colon 38 y/o  @35.1 weeks gestation presents to triage  with c/o elevated blood pressure. Pt states she had a elevated blood pressure in the Ob office yesterday 133/93. At which time she was told to monitor her blood pressure at home. Pt states her blood pressure at work was 150/110 today. Pt denies headache, epigastric pain and visual disturbances. Pt states was to start a 24 hour urine collection tomorrow Pt c/o abdominal fullness. Pt c/o mild, irregular ctx. Pt denies Lof, Vb. Pt reports good fetal movement. Pt is scheduled for repeat , pt desires to TOLAC.  Current AP course uncomplicated  Medical H/x: Denies  Surgical H/x:  x1  Ob/Gyn H/x: 2018// Failed IOL secondary to PEC  Psych H/x: Denies  Meds: Pnv, Vitamin D2  NKDA

## 2019-08-07 ENCOUNTER — ASOB RESULT (OUTPATIENT)
Age: 39
End: 2019-08-07

## 2019-08-07 ENCOUNTER — TRANSCRIPTION ENCOUNTER (OUTPATIENT)
Age: 39
End: 2019-08-07

## 2019-08-07 ENCOUNTER — APPOINTMENT (OUTPATIENT)
Dept: ANTEPARTUM | Facility: HOSPITAL | Age: 39
End: 2019-08-07
Payer: COMMERCIAL

## 2019-08-07 ENCOUNTER — OUTPATIENT (OUTPATIENT)
Dept: OUTPATIENT SERVICES | Facility: HOSPITAL | Age: 39
LOS: 1 days | End: 2019-08-07

## 2019-08-07 VITALS — TEMPERATURE: 98 F

## 2019-08-07 DIAGNOSIS — Z98.891 HISTORY OF UTERINE SCAR FROM PREVIOUS SURGERY: Chronic | ICD-10-CM

## 2019-08-07 DIAGNOSIS — O14.90 UNSPECIFIED PRE-ECLAMPSIA, UNSPECIFIED TRIMESTER: ICD-10-CM

## 2019-08-07 LAB
ALBUMIN SERPL ELPH-MCNC: 3.3 G/DL — SIGNIFICANT CHANGE UP (ref 3.3–5)
ALP SERPL-CCNC: 168 U/L — HIGH (ref 40–120)
ALT FLD-CCNC: 11 U/L — SIGNIFICANT CHANGE UP (ref 4–33)
ANION GAP SERPL CALC-SCNC: 12 MMO/L — SIGNIFICANT CHANGE UP (ref 7–14)
APTT BLD: 26.5 SEC — LOW (ref 27.5–36.3)
AST SERPL-CCNC: 16 U/L — SIGNIFICANT CHANGE UP (ref 4–32)
BASOPHILS # BLD AUTO: 0.03 K/UL — SIGNIFICANT CHANGE UP (ref 0–0.2)
BASOPHILS NFR BLD AUTO: 0.2 % — SIGNIFICANT CHANGE UP (ref 0–2)
BILIRUB SERPL-MCNC: < 0.2 MG/DL — LOW (ref 0.2–1.2)
BLD GP AB SCN SERPL QL: NEGATIVE — SIGNIFICANT CHANGE UP
BUN SERPL-MCNC: 9 MG/DL — SIGNIFICANT CHANGE UP (ref 7–23)
CALCIUM SERPL-MCNC: 9.3 MG/DL — SIGNIFICANT CHANGE UP (ref 8.4–10.5)
CHLORIDE SERPL-SCNC: 107 MMOL/L — SIGNIFICANT CHANGE UP (ref 98–107)
CO2 SERPL-SCNC: 19 MMOL/L — LOW (ref 22–31)
CREAT SERPL-MCNC: 0.59 MG/DL — SIGNIFICANT CHANGE UP (ref 0.5–1.3)
EOSINOPHIL # BLD AUTO: 0.01 K/UL — SIGNIFICANT CHANGE UP (ref 0–0.5)
EOSINOPHIL NFR BLD AUTO: 0.1 % — SIGNIFICANT CHANGE UP (ref 0–6)
FIBRINOGEN PPP-MCNC: 736 MG/DL — HIGH (ref 350–510)
GLUCOSE SERPL-MCNC: 111 MG/DL — HIGH (ref 70–99)
HCT VFR BLD CALC: 38.4 % — SIGNIFICANT CHANGE UP (ref 34.5–45)
HGB BLD-MCNC: 13.1 G/DL — SIGNIFICANT CHANGE UP (ref 11.5–15.5)
IMM GRANULOCYTES NFR BLD AUTO: 0.6 % — SIGNIFICANT CHANGE UP (ref 0–1.5)
INR BLD: 0.86 — LOW (ref 0.88–1.17)
LDH SERPL L TO P-CCNC: 189 U/L — SIGNIFICANT CHANGE UP (ref 135–225)
LYMPHOCYTES # BLD AUTO: 1.02 K/UL — SIGNIFICANT CHANGE UP (ref 1–3.3)
LYMPHOCYTES # BLD AUTO: 8.2 % — LOW (ref 13–44)
MCHC RBC-ENTMCNC: 31.4 PG — SIGNIFICANT CHANGE UP (ref 27–34)
MCHC RBC-ENTMCNC: 34.1 % — SIGNIFICANT CHANGE UP (ref 32–36)
MCV RBC AUTO: 92.1 FL — SIGNIFICANT CHANGE UP (ref 80–100)
MONOCYTES # BLD AUTO: 0.25 K/UL — SIGNIFICANT CHANGE UP (ref 0–0.9)
MONOCYTES NFR BLD AUTO: 2 % — SIGNIFICANT CHANGE UP (ref 2–14)
NEUTROPHILS # BLD AUTO: 11.03 K/UL — HIGH (ref 1.8–7.4)
NEUTROPHILS NFR BLD AUTO: 88.9 % — HIGH (ref 43–77)
NRBC # FLD: 0 K/UL — SIGNIFICANT CHANGE UP (ref 0–0)
PLATELET # BLD AUTO: 168 K/UL — SIGNIFICANT CHANGE UP (ref 150–400)
PMV BLD: 13.8 FL — HIGH (ref 7–13)
POTASSIUM SERPL-MCNC: 4.6 MMOL/L — SIGNIFICANT CHANGE UP (ref 3.5–5.3)
POTASSIUM SERPL-SCNC: 4.6 MMOL/L — SIGNIFICANT CHANGE UP (ref 3.5–5.3)
PROT SERPL-MCNC: 6.4 G/DL — SIGNIFICANT CHANGE UP (ref 6–8.3)
PROTHROM AB SERPL-ACNC: 9.7 SEC — LOW (ref 9.8–13.1)
RBC # BLD: 4.17 M/UL — SIGNIFICANT CHANGE UP (ref 3.8–5.2)
RBC # FLD: 13 % — SIGNIFICANT CHANGE UP (ref 10.3–14.5)
RH IG SCN BLD-IMP: POSITIVE — SIGNIFICANT CHANGE UP
SODIUM SERPL-SCNC: 138 MMOL/L — SIGNIFICANT CHANGE UP (ref 135–145)
T PALLIDUM AB TITR SER: NEGATIVE — SIGNIFICANT CHANGE UP
URATE SERPL-MCNC: 4.7 MG/DL — SIGNIFICANT CHANGE UP (ref 2.5–7)
WBC # BLD: 12.41 K/UL — HIGH (ref 3.8–10.5)
WBC # FLD AUTO: 12.41 K/UL — HIGH (ref 3.8–10.5)

## 2019-08-07 PROCEDURE — 76811 OB US DETAILED SNGL FETUS: CPT | Mod: 26

## 2019-08-07 PROCEDURE — 76819 FETAL BIOPHYS PROFIL W/O NST: CPT | Mod: 26

## 2019-08-07 RX ADMIN — Medication 12 MILLIGRAM(S): at 01:23

## 2019-08-07 RX ADMIN — Medication 1 TABLET(S): at 15:06

## 2019-08-07 NOTE — DISCHARGE NOTE OB - HOSPITAL COURSE
39y at  36+2 admitted for elevated BPs with history of sPEC in previous pregnancy. Pt now with mild range BPs 4 hrs apart and with P/C of 0.5, now with dx of PEC without severe features    Pt received betamethasone X 2 doses

## 2019-08-07 NOTE — PROGRESS NOTE ADULT - ASSESSMENT
Assessment/Plan  39y at  36+2 admitted for elevated BPs with history of sPEC in previous pregnancy. Pt now with mild range BPs 4 hrs apart and with P/C of 0.5, now with dx of PEC without severe features

## 2019-08-07 NOTE — CHART NOTE - NSCHARTNOTEFT_GEN_A_CORE
Associate Chief Of L & D    I was covering for the [facility] group-  I have verbally signed this patient and the event of the day to Dr Granda.    CATHLEEN Wood.

## 2019-08-07 NOTE — DISCHARGE NOTE OB - MEDICATION SUMMARY - MEDICATIONS TO TAKE
I will START or STAY ON the medications listed below when I get home from the hospital:    automated blood pressure device  -- Apply on skin to affected area 3 times a day   -- Indication: For Home    Prenatal 1 oral capsule  -- 1 tab(s) by mouth once a day  -- Indication: For Home    Vitamin D2 50,000 intl units (1.25 mg) oral capsule  -- 1 cap(s) by mouth once a day (before a meal)  -- Indication: For Home

## 2019-08-07 NOTE — PROGRESS NOTE ADULT - PROBLEM SELECTOR PLAN 1
1. Maternal wellbeing  Neuro: no acute issues  Pulm: O2 sat WNL on RA, ambulation  GI: regular diet  : voiding spontaneously  Heme: SCDs while in bed for DVT ppx, encourage ambulation   ID: afebrile, no signs of infection    -HELLP labs in AM  - BP monitoring q30 min  - 24 hr urine collection     2. Fetal wellbeing  -BMZ for fetal lung maturity  - NST BID    Abbi Mace, PGY3

## 2019-08-07 NOTE — DISCHARGE NOTE OB - CARE PLAN
Principal Discharge DX:	Hypertension, unspecified type  Goal:	pregnancy maintenance  Assessment and plan of treatment:	24 hour urine was collected  BPs all mild range

## 2019-08-07 NOTE — DISCHARGE NOTE OB - PATIENT PORTAL LINK FT
You can access the [facility] Portal, offered by [facility], by registering with the following website: [email or web address]

## 2019-08-07 NOTE — CHART NOTE - NSCHARTNOTEFT_GEN_A_CORE
Associate Chief of L & D ( covering for Dr pedro group-Late entry)    This patient was signed out to me by Dr Pemberton.  I have reviewed her chart and she is noted to be a 40 yo  at 35.1 weeks gestation who was sent from her Pvt Ob office with elevated BP's.  Upon review of the chart, it appears that she was on labetalol and procardia.  She is currently received  steroids and is in the process of a 24 hour urine collection which is due to be completed at 1 am .  Bp's have remained stable throughout the night and will continue to follow closely.  I have discussed this patient with Dr. Santillan who will do a U/S.  Her labs were reviewed and they are stable    Vital Signs Last 24 Hrs  T(C): 37.1 (07 Aug 2019 10:15), Max: 37.1 (07 Aug 2019 02:00)  T(F): 98.8 (07 Aug 2019 10:15), Max: 98.8 (07 Aug 2019 10:15)  HR: 101 (07 Aug 2019 10:15) (80 - 128)  BP: 112/72 (07 Aug 2019 10:15) (112/72 - 145/87)  BP(mean): --  RR: 17 (07 Aug 2019 10:15) (16 - 17)  SpO2: 96% (07 Aug 2019 10:15) (95% - 96%)    LABS:                        13.1   12.41 )-----------( 168      ( 07 Aug 2019 06:00 )             38.4     07 Aug 2019 06:00    138    |  107    |  9      ----------------------------<  111    4.6     |  19     |  0.59     Ca    9.3        07 Aug 2019 06:00    TPro  6.4    /  Alb  3.3    /  TBili  < 0.2  /  DBili  x      /  AST  16     /  ALT  11     /  AlkPhos  168    07 Aug 2019 06:00    PT/INR - ( 07 Aug 2019 06:00 )   PT: 9.7 SEC;   INR: 0.86          PTT - ( 07 Aug 2019 06:00 )  PTT:26.5 SEC    Discussed with the patient that I would recommend that she stay and complete her betamethasone and her 24 hour urine collection.  She under stood and explained that when I sign out to Dr Osuna that I will make her aware of her desires to leave after her completion. Associate Chief of L & D ( covering for Dr pedro group-Late entry)    This patient was signed out to me by Dr Pemberton.  I have reviewed her chart and she is noted to be a 40 yo  at 35.1 weeks gestation who was sent from her Pvt Ob office with elevated BP's.  Upon review of the chart, it appears that she was on labetalol and procardia with her prior p[regnancy.  She is currently received  steroids and is in the process of a 24 hour urine collection which is due to be completed at 1 am .  Bp's have remained stable throughout the night and will continue to follow closely.  I have discussed this patient with Dr. Santillan who will do a U/S.  Her labs were reviewed and they are stable    Vital Signs Last 24 Hrs  T(C): 37.1 (07 Aug 2019 10:15), Max: 37.1 (07 Aug 2019 02:00)  T(F): 98.8 (07 Aug 2019 10:15), Max: 98.8 (07 Aug 2019 10:15)  HR: 101 (07 Aug 2019 10:15) (80 - 128)  BP: 112/72 (07 Aug 2019 10:15) (112/72 - 145/87)  BP(mean): --  RR: 17 (07 Aug 2019 10:15) (16 - 17)  SpO2: 96% (07 Aug 2019 10:15) (95% - 96%)    LABS:                        13.1   12.41 )-----------( 168      ( 07 Aug 2019 06:00 )             38.4     07 Aug 2019 06:00    138    |  107    |  9      ----------------------------<  111    4.6     |  19     |  0.59     Ca    9.3        07 Aug 2019 06:00    TPro  6.4    /  Alb  3.3    /  TBili  < 0.2  /  DBili  x      /  AST  16     /  ALT  11     /  AlkPhos  168    07 Aug 2019 06:00    PT/INR - ( 07 Aug 2019 06:00 )   PT: 9.7 SEC;   INR: 0.86          PTT - ( 07 Aug 2019 06:00 )  PTT:26.5 SEC    Discussed with the patient that I would recommend that she stay and complete her betamethasone and her 24 hour urine collection.  She under stood and explained that when I sign out to Dr Osuna that I will make her aware of her desires to leave after her completion.

## 2019-08-07 NOTE — DISCHARGE NOTE OB - CARE PROVIDER_API CALL
Luis Colon)  Obstetrics and Gynecology  [address] [place] [address]  Phone: [phone]  Fax: [phone]  Follow Up Time:

## 2019-08-07 NOTE — PROGRESS NOTE ADULT - SUBJECTIVE AND OBJECTIVE BOX
Antepartum Progress Note    HD#2    Pt seen and evaluated at bedside.   Reports no acute events overnight.    denies headache, chest pain, shortness of breath, epigastric and RUQ pain   No contractions, abdominal pain, vaginal bleeding, loss of fluid, endorses normal fetal movements.    Exam  T(C): 37 (08-07 @ 02:17), Max: 37.1 (08-07 @ 02:00)  HR: 94 (08-07 @ 02:44) (92 - 114)  BP: 134/87 (08-07 @ 02:44) (117/86 - 145/87)  RR: 16 (08-07 @ 02:17) (16 - 16)    Gen: No acute distress  CV: Regular rate and rhythm  Pulm: CTABL  Abd: gravid, non tender   Ext: Nontender bilaterally          betamethasone Injectable 12 milliGRAM(s) IntraMuscular every 24 hours  prenatal multivitamin 1 Tablet(s) Oral daily

## 2019-08-08 LAB
M PROTEIN 24H MFR UR ELPH: 529 MG/24 HR — SIGNIFICANT CHANGE UP
SPECIMEN SOURCE: SIGNIFICANT CHANGE UP
SPECIMEN VOL 24H UR: 2300 ML — SIGNIFICANT CHANGE UP

## 2019-08-08 RX ADMIN — Medication 12 MILLIGRAM(S): at 01:23

## 2019-08-09 LAB — GP B STREP GENITAL QL CULT: SIGNIFICANT CHANGE UP

## 2019-08-12 ENCOUNTER — OUTPATIENT (OUTPATIENT)
Dept: OUTPATIENT SERVICES | Facility: HOSPITAL | Age: 39
LOS: 1 days | End: 2019-08-12

## 2019-08-12 ENCOUNTER — TRANSCRIPTION ENCOUNTER (OUTPATIENT)
Age: 39
End: 2019-08-12

## 2019-08-12 VITALS
WEIGHT: 182.1 LBS | OXYGEN SATURATION: 98 % | TEMPERATURE: 97 F | SYSTOLIC BLOOD PRESSURE: 130 MMHG | HEART RATE: 87 BPM | HEIGHT: 63 IN | DIASTOLIC BLOOD PRESSURE: 70 MMHG | RESPIRATION RATE: 16 BRPM

## 2019-08-12 DIAGNOSIS — Z98.891 HISTORY OF UTERINE SCAR FROM PREVIOUS SURGERY: ICD-10-CM

## 2019-08-12 DIAGNOSIS — Z98.891 HISTORY OF UTERINE SCAR FROM PREVIOUS SURGERY: Chronic | ICD-10-CM

## 2019-08-12 LAB
ALBUMIN SERPL ELPH-MCNC: 3.3 G/DL — SIGNIFICANT CHANGE UP (ref 3.3–5)
ALP SERPL-CCNC: 167 U/L — HIGH (ref 40–120)
ALT FLD-CCNC: 11 U/L — SIGNIFICANT CHANGE UP (ref 4–33)
ANION GAP SERPL CALC-SCNC: 16 MMO/L — HIGH (ref 7–14)
APPEARANCE UR: CLEAR — SIGNIFICANT CHANGE UP
AST SERPL-CCNC: 17 U/L — SIGNIFICANT CHANGE UP (ref 4–32)
BACTERIA # UR AUTO: SIGNIFICANT CHANGE UP
BILIRUB SERPL-MCNC: < 0.2 MG/DL — LOW (ref 0.2–1.2)
BILIRUB UR-MCNC: NEGATIVE — SIGNIFICANT CHANGE UP
BLD GP AB SCN SERPL QL: NEGATIVE — SIGNIFICANT CHANGE UP
BLOOD UR QL VISUAL: NEGATIVE — SIGNIFICANT CHANGE UP
BUN SERPL-MCNC: 11 MG/DL — SIGNIFICANT CHANGE UP (ref 7–23)
CALCIUM SERPL-MCNC: 9.8 MG/DL — SIGNIFICANT CHANGE UP (ref 8.4–10.5)
CHLORIDE SERPL-SCNC: 100 MMOL/L — SIGNIFICANT CHANGE UP (ref 98–107)
CO2 SERPL-SCNC: 22 MMOL/L — SIGNIFICANT CHANGE UP (ref 22–31)
COD CRY URNS QL: SIGNIFICANT CHANGE UP (ref 0–0)
COLOR SPEC: YELLOW — SIGNIFICANT CHANGE UP
CREAT SERPL-MCNC: 0.64 MG/DL — SIGNIFICANT CHANGE UP (ref 0.5–1.3)
GLUCOSE SERPL-MCNC: 92 MG/DL — SIGNIFICANT CHANGE UP (ref 70–99)
GLUCOSE UR-MCNC: NEGATIVE — SIGNIFICANT CHANGE UP
HCT VFR BLD CALC: 39.1 % — SIGNIFICANT CHANGE UP (ref 34.5–45)
HGB BLD-MCNC: 12.5 G/DL — SIGNIFICANT CHANGE UP (ref 11.5–15.5)
HYALINE CASTS # UR AUTO: NEGATIVE — SIGNIFICANT CHANGE UP
KETONES UR-MCNC: NEGATIVE — SIGNIFICANT CHANGE UP
LEUKOCYTE ESTERASE UR-ACNC: NEGATIVE — SIGNIFICANT CHANGE UP
MCHC RBC-ENTMCNC: 29.8 PG — SIGNIFICANT CHANGE UP (ref 27–34)
MCHC RBC-ENTMCNC: 32 % — SIGNIFICANT CHANGE UP (ref 32–36)
MCV RBC AUTO: 93.3 FL — SIGNIFICANT CHANGE UP (ref 80–100)
MUCOUS THREADS # UR AUTO: SIGNIFICANT CHANGE UP
NITRITE UR-MCNC: NEGATIVE — SIGNIFICANT CHANGE UP
NRBC # FLD: 0 K/UL — SIGNIFICANT CHANGE UP (ref 0–0)
PH UR: 6.5 — SIGNIFICANT CHANGE UP (ref 5–8)
PLATELET # BLD AUTO: 161 K/UL — SIGNIFICANT CHANGE UP (ref 150–400)
PMV BLD: 13 FL — SIGNIFICANT CHANGE UP (ref 7–13)
POTASSIUM SERPL-MCNC: 4 MMOL/L — SIGNIFICANT CHANGE UP (ref 3.5–5.3)
POTASSIUM SERPL-SCNC: 4 MMOL/L — SIGNIFICANT CHANGE UP (ref 3.5–5.3)
PROT SERPL-MCNC: 6.2 G/DL — SIGNIFICANT CHANGE UP (ref 6–8.3)
PROT UR-MCNC: 30 — SIGNIFICANT CHANGE UP
RBC # BLD: 4.19 M/UL — SIGNIFICANT CHANGE UP (ref 3.8–5.2)
RBC # FLD: 13.2 % — SIGNIFICANT CHANGE UP (ref 10.3–14.5)
RBC CASTS # UR COMP ASSIST: SIGNIFICANT CHANGE UP (ref 0–?)
RH IG SCN BLD-IMP: POSITIVE — SIGNIFICANT CHANGE UP
SODIUM SERPL-SCNC: 138 MMOL/L — SIGNIFICANT CHANGE UP (ref 135–145)
SP GR SPEC: 1.02 — SIGNIFICANT CHANGE UP (ref 1–1.04)
SQUAMOUS # UR AUTO: SIGNIFICANT CHANGE UP
UROBILINOGEN FLD QL: SIGNIFICANT CHANGE UP
WBC # BLD: 8.74 K/UL — SIGNIFICANT CHANGE UP (ref 3.8–10.5)
WBC # FLD AUTO: 8.74 K/UL — SIGNIFICANT CHANGE UP (ref 3.8–10.5)
WBC UR QL: SIGNIFICANT CHANGE UP (ref 0–?)

## 2019-08-12 RX ORDER — ERGOCALCIFEROL 1.25 MG/1
1 CAPSULE ORAL
Qty: 0 | Refills: 0 | DISCHARGE

## 2019-08-12 RX ORDER — SODIUM CHLORIDE 9 MG/ML
1000 INJECTION, SOLUTION INTRAVENOUS
Refills: 0 | Status: DISCONTINUED | OUTPATIENT
Start: 2019-08-13 | End: 2019-08-15

## 2019-08-12 NOTE — OB PST NOTE - STREET DRUG/MEDICATION/INHALANT, DURATION OF USE, PROFILE
Pt reports she had problem with marijuana and alcohol 15 years ago.  No substance abuse since that time per pt

## 2019-08-12 NOTE — OB PST NOTE - ANESTHESIA, PREVIOUS REACTION, PROFILE
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  pt does not want narcotics that could make her feel "high"

## 2019-08-12 NOTE — OB PST NOTE - HISTORY OF PRESENT ILLNESS
38 y/o female  with hx of preeclampsia during previous pregnancy with emergent  performed after failed induction at 37 weeks gestation.  Pt now with EDC 19, reports she was told she has preeclampsia in her current pregnancy, BP's have been WNL per pt.  Pt reports she received steroid injections x2 1 week ago. Now scheduled for Repeat  section 19.     Pt reports she feels baby moving today as usual

## 2019-08-12 NOTE — OB PST NOTE - NSSUBSTANCEUSE_GEN_ALL_CORE_SD
street drug/inhalant/medication abuse/caffeine/Pt reports she had problem with marijuana and  alcohol 15v years ago

## 2019-08-12 NOTE — OB PST NOTE - NSHPPHYSICALEXAM_GEN_ALL_CORE
Constitutional: Well Developed, Well Groomed, Well Nourished, No Distress    Eyes: PERRL, EOMI, conjunctiva clear    Ears: Normal    Mouth & Gums: Normal, moist    Pharynx: No tenderness, discharge, or peritonsillar abscess    Tonsils: No Redness, discharge, tenderness, or swelling    Neck: Supple, no JVD, normal thyroid glands, no carotid bruits, no cervical vertebral or paraspinal tenderness    Breast: Normal shape, no masses, no tenderness, nipples normal, no nipple discharge    Back: Normal shape, ROM intact, strength intact, no vertebral tenderness    Respiratory: Airway patent, breath sounds equal, good air movement, respiration non-labored, clear to auscultation bilateral, no chest wall tenderness, no intercostal retractions, no rales, no wheezes, no rhonchi, no subcutaneous emphysema    Cardiovascular:  Regular rate and rhythm, no rubs or murmur, normal PMI    Gastrointestinal: Soft, non-tender, non distention, no masses palpable, bowel sound normal, no bruit, no rebound tenderness    Extremities: No clubbing, cyanosis, or pedal edema    Vascular:  Carotid Pulse normal , Radial Pulse normal, Femoral Pulse normal, DP pulse normal, PT pulse normal    Neurological: alert & oriented x 3, sensation intact, deep reflexes intact, cranial nerve intact, normal strength    Skin: warm and dry, normal color    Lymph Nodes: normal posterior cervical lymph node, normal anterior cervical lymph node, normal supraclavicular lymph node, normal axillary lymph node, normal inguinal lymph node, normal femoral lymph node    Musculoskeletal: ROM intact, no joint swelling, warmth, or calf tenderness. Normal strength    Psychiatric: normal affect, normal behavior Constitutional: Well Developed, Well Groomed, Well Nourished, No Distress    Eyes: PERRL, EOMI, conjunctiva clear    Ears: Normal    Mouth & Gums: Normal, moist    Pharynx: No tenderness, discharge, or peritonsillar abscess    Tonsils: No Redness, discharge, tenderness, or swelling    Neck: Supple, no JVD, normal thyroid glands, no carotid bruits, no cervical vertebral or paraspinal tenderness    Breast: Normal shape, no masses, no tenderness, nipples normal, no nipple discharge    Back: Normal shape, ROM intact, strength intact, no vertebral tenderness    Respiratory: Airway patent, breath sounds equal, good air movement, respiration non-labored, clear to auscultation bilateral, no chest wall tenderness, no intercostal retractions, no rales, no wheezes, no rhonchi, no subcutaneous emphysema    Cardiovascular:  Regular rate and rhythm, no rubs or murmur, normal PMI    Gastrointestinal: Soft, non-tender, non distention, no masses palpable, bowel sound normal, no bruit, no rebound tenderness    Extremities:  Slight swelling bilateral ankles, non pitting No clubbing, cyanosis    Vascular:  Carotid Pulse normal , Radial Pulse normal, Femoral Pulse normal, DP pulse normal, PT pulse normal    Neurological: alert & oriented x 3, sensation intact, deep reflexes intact, cranial nerve intact, normal strength    Skin: warm and dry, normal color    Lymph Nodes: normal posterior cervical lymph node, normal anterior cervical lymph node, normal supraclavicular lymph node, normal axillary lymph node, normal inguinal lymph node, normal femoral lymph node    Musculoskeletal: ROM intact, no joint swelling, warmth, or calf tenderness. Normal strength    Psychiatric: normal affect, normal behavior

## 2019-08-12 NOTE — OB PST NOTE - PROBLEM SELECTOR PLAN 1
Repeat  section 19.     CBC CMP T&S UA    Preop instructions and antibacterial soap given and explained (verbal and written), with teach back. Repeat  section 19.     CBC CMP T&S UA RPR    Preop instructions and antibacterial soap given and explained (verbal and written), with teach back.

## 2019-08-12 NOTE — OB PST NOTE - NSHPREVIEWOFSYSTEMS_GEN_ALL_CORE
General: No fever, chills, sweating, anorexia, weight loss or weight gain. No polyphagia, polyurea, polydypsia, malaise, or fatigue    Skin: No rashes, itching, or dryness. No change in size/color of moles. No tumors, brittle nails, pitted nails, or hair loss    Breast: No tenderness, lumps, or nipple discharge      Ophthalmologic: No diplopia, photophobia, lacrimation, blurred Vision , or eye discharge    ENMT Symptoms: No hearing difficulty, ear pain, tinnitus, or vertigo. No sinus symptoms, nasal congestion, nasal   discharge, or nasal obstruction    Respiratory and Thorax: No wheezing, dyspnea, cough, hemoptysis, or pleuritic chest pPain     Cardiovascular: No chest pain, palpitations, dyspnea on exertion, orthopnea, paroxysmal nocturnal dyspnea,   peripheral edema, or claudication    Gastrointestinal: No nausea, vomiting, diarrhea, constipation, change in bowel habits, flatulence, abdominal pain, or melena    Genitourinary/ Pelvis: No hematuria, renal colic, or flank pain.  No urine discoloration, incontinence, dysuria, or urinary hesitancy. Normal urinary frequency. No nocturia, abnormal vaginal bleeding, vaginal discharge, spotting, pelvic pain, or vaginal leakage    Musculoskeletal: No arthralgia, arthritis, joint swelling, muscle cramping, muscle weakness, neck pain, arm pain, or leg pain    Neurological: No transient paralysis, weakness, paresthesias, or seizures. No syncope, tremors, vertigo, loss of sensation, difficulty walking, loss of consciousness, hemiparesis, confusion, or facial palsy    Psychiatric: No suicidal ideation, depression, anxiety, insomnia, memory loss, paranoia, mood swings, agitation, hallucinations, or hyperactivity    Hematology: No gum bleeding, nose bleeding, or skin lumps    Lymphatic: No enlarged or tender lymph nodes. No extremity swelling    Endocrine: No heat or cold intolerance    Immunologic: No recurrent or persistent infections General: No fever, chills, sweating, anorexia, weight loss or weight gain. No polyphagia, polyurea, polydypsia, malaise, or fatigue    Skin: No rashes, itching, or dryness. No change in size/color of moles. No tumors, brittle nails, pitted nails, or hair loss    Breast: No tenderness, lumps, or nipple discharge      Ophthalmologic:  No visual changes, no blurry vision. No diplopia, photophobia, lacrimation, blurred Vision , or eye discharge    ENMT Symptoms: No hearing difficulty, ear pain, tinnitus, or vertigo. No sinus symptoms, nasal congestion, nasal   discharge, or nasal obstruction    Respiratory and Thorax: No wheezing, dyspnea, cough, hemoptysis, or pleuritic chest pPain     Cardiovascular: No chest pain, palpitations, dyspnea on exertion, orthopnea, paroxysmal nocturnal dyspnea,   peripheral edema, or claudication    Gastrointestinal: No nausea, vomiting, diarrhea, constipation, change in bowel habits, flatulence, abdominal pain, or melena    Genitourinary/ Pelvis: No hematuria, renal colic, or flank pain.  No urine discoloration, incontinence, dysuria, or urinary hesitancy. Normal urinary frequency. No nocturia, abnormal vaginal bleeding, vaginal discharge, spotting, pelvic pain, or vaginal leakage    Musculoskeletal: No arthralgia, arthritis, joint swelling, muscle cramping, muscle weakness, neck pain, arm pain, or leg pain    Neurological:  denies headache, no blurry vision, no visual changes. No transient paralysis, weakness, paresthesias, or seizures. No syncope, tremors, vertigo, loss of sensation, difficulty walking, loss of consciousness, hemiparesis, confusion, or facial palsy.      Psychiatric: No suicidal ideation, depression, anxiety, insomnia, memory loss, paranoia, mood swings, agitation, hallucinations, or hyperactivity    Hematology: No gum bleeding, nose bleeding, or skin lumps    Lymphatic: No enlarged or tender lymph nodes. No extremity swelling    Endocrine: No heat or cold intolerance    Immunologic: No recurrent or persistent infections

## 2019-08-12 NOTE — OB PST NOTE - ASSESSMENT
40 y/o female  with hx of preeclampsia during previous pregnancy with emergent  performed after failed induction at 37 weeks gestation.  Pt now with EDC 19, reports she was told she has preeclampsia in her current pregnancy, BP's have been WNL per pt.  Pt reports she received steroid injections x2 1 week ago. Now scheduled for Repeat  section 19.

## 2019-08-13 ENCOUNTER — RESULT REVIEW (OUTPATIENT)
Age: 39
End: 2019-08-13

## 2019-08-13 ENCOUNTER — INPATIENT (INPATIENT)
Facility: HOSPITAL | Age: 39
LOS: 2 days | Discharge: HOME | End: 2019-08-16
Attending: STUDENT IN AN ORGANIZED HEALTH CARE EDUCATION/TRAINING PROGRAM | Admitting: STUDENT IN AN ORGANIZED HEALTH CARE EDUCATION/TRAINING PROGRAM
Payer: COMMERCIAL

## 2019-08-13 ENCOUNTER — TRANSCRIPTION ENCOUNTER (OUTPATIENT)
Age: 39
End: 2019-08-13

## 2019-08-13 VITALS — SYSTOLIC BLOOD PRESSURE: 174 MMHG | HEART RATE: 81 BPM | DIASTOLIC BLOOD PRESSURE: 107 MMHG

## 2019-08-13 DIAGNOSIS — Z98.891 HISTORY OF UTERINE SCAR FROM PREVIOUS SURGERY: Chronic | ICD-10-CM

## 2019-08-13 DIAGNOSIS — O14.93 UNSPECIFIED PRE-ECLAMPSIA, THIRD TRIMESTER: ICD-10-CM

## 2019-08-13 LAB
ALBUMIN SERPL ELPH-MCNC: 3.5 G/DL — SIGNIFICANT CHANGE UP (ref 3.3–5)
ALP SERPL-CCNC: 181 U/L — HIGH (ref 40–120)
ALT FLD-CCNC: 12 U/L — SIGNIFICANT CHANGE UP (ref 4–33)
ANION GAP SERPL CALC-SCNC: 16 MMO/L — HIGH (ref 7–14)
APTT BLD: 27.2 SEC — LOW (ref 27.5–36.3)
AST SERPL-CCNC: 21 U/L — SIGNIFICANT CHANGE UP (ref 4–32)
BASOPHILS # BLD AUTO: 0.04 K/UL — SIGNIFICANT CHANGE UP (ref 0–0.2)
BASOPHILS NFR BLD AUTO: 0.4 % — SIGNIFICANT CHANGE UP (ref 0–2)
BILIRUB SERPL-MCNC: 0.2 MG/DL — SIGNIFICANT CHANGE UP (ref 0.2–1.2)
BLD GP AB SCN SERPL QL: NEGATIVE — SIGNIFICANT CHANGE UP
BUN SERPL-MCNC: 8 MG/DL — SIGNIFICANT CHANGE UP (ref 7–23)
CALCIUM SERPL-MCNC: 9.4 MG/DL — SIGNIFICANT CHANGE UP (ref 8.4–10.5)
CHLORIDE SERPL-SCNC: 102 MMOL/L — SIGNIFICANT CHANGE UP (ref 98–107)
CO2 SERPL-SCNC: 21 MMOL/L — LOW (ref 22–31)
CREAT SERPL-MCNC: 0.64 MG/DL — SIGNIFICANT CHANGE UP (ref 0.5–1.3)
EOSINOPHIL # BLD AUTO: 0.07 K/UL — SIGNIFICANT CHANGE UP (ref 0–0.5)
EOSINOPHIL NFR BLD AUTO: 0.7 % — SIGNIFICANT CHANGE UP (ref 0–6)
FIBRINOGEN PPP-MCNC: 731 MG/DL — HIGH (ref 350–510)
GLUCOSE SERPL-MCNC: 74 MG/DL — SIGNIFICANT CHANGE UP (ref 70–99)
HCT VFR BLD CALC: 43.3 % — SIGNIFICANT CHANGE UP (ref 34.5–45)
HGB BLD-MCNC: 14.4 G/DL — SIGNIFICANT CHANGE UP (ref 11.5–15.5)
IMM GRANULOCYTES NFR BLD AUTO: 1 % — SIGNIFICANT CHANGE UP (ref 0–1.5)
INR BLD: 0.81 — LOW (ref 0.88–1.17)
LDH SERPL L TO P-CCNC: 274 U/L — HIGH (ref 135–225)
LYMPHOCYTES # BLD AUTO: 2.05 K/UL — SIGNIFICANT CHANGE UP (ref 1–3.3)
LYMPHOCYTES # BLD AUTO: 20.1 % — SIGNIFICANT CHANGE UP (ref 13–44)
MCHC RBC-ENTMCNC: 30.3 PG — SIGNIFICANT CHANGE UP (ref 27–34)
MCHC RBC-ENTMCNC: 33.3 % — SIGNIFICANT CHANGE UP (ref 32–36)
MCV RBC AUTO: 91 FL — SIGNIFICANT CHANGE UP (ref 80–100)
MONOCYTES # BLD AUTO: 0.78 K/UL — SIGNIFICANT CHANGE UP (ref 0–0.9)
MONOCYTES NFR BLD AUTO: 7.7 % — SIGNIFICANT CHANGE UP (ref 2–14)
NEUTROPHILS # BLD AUTO: 7.15 K/UL — SIGNIFICANT CHANGE UP (ref 1.8–7.4)
NEUTROPHILS NFR BLD AUTO: 70.1 % — SIGNIFICANT CHANGE UP (ref 43–77)
NRBC # FLD: 0 K/UL — SIGNIFICANT CHANGE UP (ref 0–0)
PLATELET # BLD AUTO: 175 K/UL — SIGNIFICANT CHANGE UP (ref 150–400)
PMV BLD: 13.6 FL — HIGH (ref 7–13)
POTASSIUM SERPL-MCNC: 4.2 MMOL/L — SIGNIFICANT CHANGE UP (ref 3.5–5.3)
POTASSIUM SERPL-SCNC: 4.2 MMOL/L — SIGNIFICANT CHANGE UP (ref 3.5–5.3)
PROT SERPL-MCNC: 7 G/DL — SIGNIFICANT CHANGE UP (ref 6–8.3)
PROTHROM AB SERPL-ACNC: 9.2 SEC — LOW (ref 9.8–13.1)
RBC # BLD: 4.76 M/UL — SIGNIFICANT CHANGE UP (ref 3.8–5.2)
RBC # FLD: 13.2 % — SIGNIFICANT CHANGE UP (ref 10.3–14.5)
RH IG SCN BLD-IMP: POSITIVE — SIGNIFICANT CHANGE UP
SODIUM SERPL-SCNC: 139 MMOL/L — SIGNIFICANT CHANGE UP (ref 135–145)
T PALLIDUM AB TITR SER: NEGATIVE — SIGNIFICANT CHANGE UP
URATE SERPL-MCNC: 5.1 MG/DL — SIGNIFICANT CHANGE UP (ref 2.5–7)
WBC # BLD: 10.19 K/UL — SIGNIFICANT CHANGE UP (ref 3.8–10.5)
WBC # FLD AUTO: 10.19 K/UL — SIGNIFICANT CHANGE UP (ref 3.8–10.5)

## 2019-08-13 PROCEDURE — 88307 TISSUE EXAM BY PATHOLOGIST: CPT | Mod: 26

## 2019-08-13 RX ORDER — OXYTOCIN 10 UNIT/ML
50 VIAL (ML) INJECTION
Qty: 20 | Refills: 0 | Status: DISCONTINUED | OUTPATIENT
Start: 2019-08-13 | End: 2019-08-15

## 2019-08-13 RX ORDER — IBUPROFEN 200 MG
600 TABLET ORAL EVERY 6 HOURS
Refills: 0 | Status: COMPLETED | OUTPATIENT
Start: 2019-08-13 | End: 2020-07-11

## 2019-08-13 RX ORDER — LANOLIN
1 OINTMENT (GRAM) TOPICAL EVERY 6 HOURS
Refills: 0 | Status: DISCONTINUED | OUTPATIENT
Start: 2019-08-13 | End: 2019-08-16

## 2019-08-13 RX ORDER — LABETALOL HCL 100 MG
200 TABLET ORAL EVERY 8 HOURS
Refills: 0 | Status: DISCONTINUED | OUTPATIENT
Start: 2019-08-13 | End: 2019-08-14

## 2019-08-13 RX ORDER — OXYCODONE HYDROCHLORIDE 5 MG/1
5 TABLET ORAL
Refills: 0 | Status: DISCONTINUED | OUTPATIENT
Start: 2019-08-13 | End: 2019-08-16

## 2019-08-13 RX ORDER — KETOROLAC TROMETHAMINE 30 MG/ML
30 SYRINGE (ML) INJECTION EVERY 6 HOURS
Refills: 0 | Status: DISCONTINUED | OUTPATIENT
Start: 2019-08-13 | End: 2019-08-15

## 2019-08-13 RX ORDER — SODIUM CHLORIDE 9 MG/ML
1000 INJECTION, SOLUTION INTRAVENOUS ONCE
Refills: 0 | Status: COMPLETED | OUTPATIENT
Start: 2019-08-13 | End: 2019-08-13

## 2019-08-13 RX ORDER — HEPARIN SODIUM 5000 [USP'U]/ML
5000 INJECTION INTRAVENOUS; SUBCUTANEOUS EVERY 12 HOURS
Refills: 0 | Status: DISCONTINUED | OUTPATIENT
Start: 2019-08-13 | End: 2019-08-16

## 2019-08-13 RX ORDER — METOCLOPRAMIDE HCL 10 MG
10 TABLET ORAL ONCE
Refills: 0 | Status: COMPLETED | OUTPATIENT
Start: 2019-08-13 | End: 2019-08-13

## 2019-08-13 RX ORDER — NALOXONE HYDROCHLORIDE 4 MG/.1ML
0.1 SPRAY NASAL
Refills: 0 | Status: DISCONTINUED | OUTPATIENT
Start: 2019-08-13 | End: 2019-08-15

## 2019-08-13 RX ORDER — ONDANSETRON 8 MG/1
4 TABLET, FILM COATED ORAL ONCE
Refills: 0 | Status: DISCONTINUED | OUTPATIENT
Start: 2019-08-14 | End: 2019-08-15

## 2019-08-13 RX ORDER — HYDROMORPHONE HYDROCHLORIDE 2 MG/ML
0.5 INJECTION INTRAMUSCULAR; INTRAVENOUS; SUBCUTANEOUS
Refills: 0 | Status: DISCONTINUED | OUTPATIENT
Start: 2019-08-13 | End: 2019-08-13

## 2019-08-13 RX ORDER — ONDANSETRON 8 MG/1
4 TABLET, FILM COATED ORAL EVERY 6 HOURS
Refills: 0 | Status: DISCONTINUED | OUTPATIENT
Start: 2019-08-13 | End: 2019-08-15

## 2019-08-13 RX ORDER — OXYCODONE HYDROCHLORIDE 5 MG/1
10 TABLET ORAL
Refills: 0 | Status: DISCONTINUED | OUTPATIENT
Start: 2019-08-13 | End: 2019-08-13

## 2019-08-13 RX ORDER — DIPHENHYDRAMINE HCL 50 MG
25 CAPSULE ORAL EVERY 6 HOURS
Refills: 0 | Status: DISCONTINUED | OUTPATIENT
Start: 2019-08-13 | End: 2019-08-16

## 2019-08-13 RX ORDER — TETANUS TOXOID, REDUCED DIPHTHERIA TOXOID AND ACELLULAR PERTUSSIS VACCINE, ADSORBED 5; 2.5; 8; 8; 2.5 [IU]/.5ML; [IU]/.5ML; UG/.5ML; UG/.5ML; UG/.5ML
0.5 SUSPENSION INTRAMUSCULAR ONCE
Refills: 0 | Status: DISCONTINUED | OUTPATIENT
Start: 2019-08-13 | End: 2019-08-16

## 2019-08-13 RX ORDER — FAMOTIDINE 10 MG/ML
20 INJECTION INTRAVENOUS ONCE
Refills: 0 | Status: COMPLETED | OUTPATIENT
Start: 2019-08-13 | End: 2019-08-13

## 2019-08-13 RX ORDER — BUPIVACAINE 13.3 MG/ML
20 INJECTION, SUSPENSION, LIPOSOMAL INFILTRATION ONCE
Refills: 0 | Status: COMPLETED | OUTPATIENT
Start: 2019-08-13 | End: 2019-08-13

## 2019-08-13 RX ORDER — ACETAMINOPHEN 500 MG
975 TABLET ORAL
Refills: 0 | Status: DISCONTINUED | OUTPATIENT
Start: 2019-08-13 | End: 2019-08-16

## 2019-08-13 RX ORDER — BUTORPHANOL TARTRATE 2 MG/ML
0.12 INJECTION, SOLUTION INTRAMUSCULAR; INTRAVENOUS EVERY 6 HOURS
Refills: 0 | Status: DISCONTINUED | OUTPATIENT
Start: 2019-08-13 | End: 2019-08-13

## 2019-08-13 RX ORDER — MAGNESIUM HYDROXIDE 400 MG/1
30 TABLET, CHEWABLE ORAL
Refills: 0 | Status: DISCONTINUED | OUTPATIENT
Start: 2019-08-13 | End: 2019-08-16

## 2019-08-13 RX ORDER — OXYTOCIN 10 UNIT/ML
333.33 VIAL (ML) INJECTION
Qty: 20 | Refills: 0 | Status: DISCONTINUED | OUTPATIENT
Start: 2019-08-13 | End: 2019-08-13

## 2019-08-13 RX ORDER — SODIUM CHLORIDE 9 MG/ML
1000 INJECTION, SOLUTION INTRAVENOUS
Refills: 0 | Status: DISCONTINUED | OUTPATIENT
Start: 2019-08-13 | End: 2019-08-15

## 2019-08-13 RX ORDER — OXYCODONE HYDROCHLORIDE 5 MG/1
5 TABLET ORAL
Refills: 0 | Status: DISCONTINUED | OUTPATIENT
Start: 2019-08-13 | End: 2019-08-13

## 2019-08-13 RX ORDER — MAGNESIUM SULFATE 500 MG/ML
2 VIAL (ML) INJECTION
Qty: 40 | Refills: 0 | Status: DISCONTINUED | OUTPATIENT
Start: 2019-08-13 | End: 2019-08-13

## 2019-08-13 RX ORDER — SODIUM CHLORIDE 9 MG/ML
1000 INJECTION, SOLUTION INTRAVENOUS
Refills: 0 | Status: DISCONTINUED | OUTPATIENT
Start: 2019-08-14 | End: 2019-08-15

## 2019-08-13 RX ORDER — CITRIC ACID/SODIUM CITRATE 300-500 MG
30 SOLUTION, ORAL ORAL ONCE
Refills: 0 | Status: COMPLETED | OUTPATIENT
Start: 2019-08-13 | End: 2019-08-13

## 2019-08-13 RX ORDER — DEXAMETHASONE 0.5 MG/5ML
4 ELIXIR ORAL EVERY 6 HOURS
Refills: 0 | Status: DISCONTINUED | OUTPATIENT
Start: 2019-08-13 | End: 2019-08-15

## 2019-08-13 RX ORDER — GLYCERIN ADULT
1 SUPPOSITORY, RECTAL RECTAL AT BEDTIME
Refills: 0 | Status: DISCONTINUED | OUTPATIENT
Start: 2019-08-13 | End: 2019-08-16

## 2019-08-13 RX ORDER — HYDROMORPHONE HYDROCHLORIDE 2 MG/ML
1 INJECTION INTRAMUSCULAR; INTRAVENOUS; SUBCUTANEOUS
Refills: 0 | Status: DISCONTINUED | OUTPATIENT
Start: 2019-08-14 | End: 2019-08-15

## 2019-08-13 RX ORDER — SIMETHICONE 80 MG/1
80 TABLET, CHEWABLE ORAL EVERY 4 HOURS
Refills: 0 | Status: DISCONTINUED | OUTPATIENT
Start: 2019-08-13 | End: 2019-08-16

## 2019-08-13 RX ORDER — DOCUSATE SODIUM 100 MG
100 CAPSULE ORAL
Refills: 0 | Status: DISCONTINUED | OUTPATIENT
Start: 2019-08-13 | End: 2019-08-16

## 2019-08-13 RX ORDER — LEVETIRACETAM 250 MG/1
500 TABLET, FILM COATED ORAL
Refills: 0 | Status: COMPLETED | OUTPATIENT
Start: 2019-08-13 | End: 2019-08-14

## 2019-08-13 RX ORDER — OXYCODONE HYDROCHLORIDE 5 MG/1
5 TABLET ORAL ONCE
Refills: 0 | Status: DISCONTINUED | OUTPATIENT
Start: 2019-08-13 | End: 2019-08-16

## 2019-08-13 RX ADMIN — Medication 30 MILLIGRAM(S): at 22:36

## 2019-08-13 RX ADMIN — SODIUM CHLORIDE 2000 MILLILITER(S): 9 INJECTION, SOLUTION INTRAVENOUS at 09:43

## 2019-08-13 RX ADMIN — Medication 10 MILLIGRAM(S): at 09:44

## 2019-08-13 RX ADMIN — Medication 30 MILLIGRAM(S): at 13:45

## 2019-08-13 RX ADMIN — BUPIVACAINE 20 MILLILITER(S): 13.3 INJECTION, SUSPENSION, LIPOSOMAL INFILTRATION at 11:25

## 2019-08-13 RX ADMIN — HEPARIN SODIUM 5000 UNIT(S): 5000 INJECTION INTRAVENOUS; SUBCUTANEOUS at 17:54

## 2019-08-13 RX ADMIN — FAMOTIDINE 20 MILLIGRAM(S): 10 INJECTION INTRAVENOUS at 09:42

## 2019-08-13 RX ADMIN — Medication 975 MILLIGRAM(S): at 18:42

## 2019-08-13 RX ADMIN — SODIUM CHLORIDE 125 MILLILITER(S): 9 INJECTION, SOLUTION INTRAVENOUS at 09:43

## 2019-08-13 RX ADMIN — Medication 30 MILLILITER(S): at 09:43

## 2019-08-13 RX ADMIN — LEVETIRACETAM 500 MILLIGRAM(S): 250 TABLET, FILM COATED ORAL at 22:33

## 2019-08-13 RX ADMIN — Medication 30 MILLIGRAM(S): at 22:32

## 2019-08-13 RX ADMIN — SODIUM CHLORIDE 50 MILLILITER(S): 9 INJECTION, SOLUTION INTRAVENOUS at 12:45

## 2019-08-13 RX ADMIN — Medication 975 MILLIGRAM(S): at 19:10

## 2019-08-13 RX ADMIN — Medication 150 MILLIUNIT(S)/MIN: at 12:50

## 2019-08-13 RX ADMIN — Medication 200 MILLIGRAM(S): at 22:33

## 2019-08-13 NOTE — OB PROVIDER H&P - ASSESSMENT
38 y/o  at 37w1d here for scheduled rLTCS for PEC with ante admission - s/p BMZ x2, no oral antihypertensives at this time. Patient denies HA, blurry vision, RUQ pain at this time.

## 2019-08-13 NOTE — CHART NOTE - NSCHARTNOTEFT_GEN_A_CORE
Spoke to Dr. Colon @ 1842 about patient's status, history and recent vital signs  Plan:  Start magnesium sulfate with no antihypertensive at this time.   Repeat Vital signs in 1 hour  Patient seen at 1844 due to recent elevated blood pressures admission at 1811 /98 asymptomatic and repeated BP 1 hour later at 1811 /98. Patient denies any headache, blur vision and/or dizziness. patient reports history of preeclampsia with first pregnancy s/p magnesium and labetalol. Patient reports being hospitalized during this pregnancy and delivered for preeclampsia. Patient denies any nausea/vomiting and states she tolerating regular diet. Patient state she refuses magnesium sulfate and ok with receiving labetalol due to elevated blood pressures.     Vital Signs  Vital Signs Last 24 Hrs  T(C): 36.8 (13 Aug 2019 16:30), Max: 37.1 (13 Aug 2019 09:22)  T(F): 98.2 (13 Aug 2019 16:30), Max: 98.8 (13 Aug 2019 09:22)  HR: 93 (13 Aug 2019 16:30) (81 - 95)  BP: 152/98 (13 Aug 2019 18:11) (118/85 - 174/107)  BP(mean): 91 (13 Aug 2019 14:00) (91 - 103)  RR: 17 (13 Aug 2019 14:00) (13 - 22)  SpO2: 98% (13 Aug 2019 16:30) (96% - 98%)      Labs                      14.4   10.19 )-----------( 175      ( 13 Aug 2019 08:55 )             43.3     08-    139  |  102  |  8   ----------------------------<  74  4.2   |  21<L>  |  0.64    Ca    9.4      13 Aug 2019 08:55    TPro  7.0  /  Alb  3.5  /  TBili  0.2  /  DBili  x   /  AST  21  /  ALT  12  /  AlkPhos  181<H>        Assessment  38y/o G P2. Tray @ 1052 postpartum  section for repeat. History of PEC 2017 delivered at 37weeks and PEC with this pregnancy hospitalized 19 and 19 discharge on no antihypertensive. History of 2 small fibroids. History of alcohol and marijuana use 15years ago. Alert and orientedx3. Patient denies any pain at this time. Lungs sounds clear bilaterally. No distress noted. Abdomen soft, nontender, nondistended. Positive bowel sounds. Abdominal dressing clean, dry and intact. Fundus firm. Hunter in place. Lochia light rubra.    Plan:  Spoke to Dr. Colon about assessment and patient's refusal of magnesium sulfate.  Plan:  Keppra to be ordered  Repeat vital sign 1 hour as previously states. If blood pressure continue to be labile will consider labetalol.  Will continue to monitor.       . Spoke to Dr. Colon @ 1842 about patient's status, history and recent vital signs  Plan:  Start magnesium sulfate with no antihypertensive at this time.   Repeat Vital signs in 1 hour  Patient seen at 1844 due to recent elevated blood pressures admission at 1811 /98 asymptomatic and repeated BP 1 hour later at 1811 /98. Patient denies any headache, blur vision and/or dizziness. patient reports history of preeclampsia with first pregnancy s/p magnesium and labetalol. Patient reports being hospitalized during this pregnancy and delivered for preeclampsia. Patient denies any nausea/vomiting and states she tolerating regular diet. Patient state she refuses magnesium sulfate and ok with receiving labetalol due to elevated blood pressures.     Vital Signs  Vital Signs Last 24 Hrs  T(C): 36.8 (13 Aug 2019 16:30), Max: 37.1 (13 Aug 2019 09:22)  T(F): 98.2 (13 Aug 2019 16:30), Max: 98.8 (13 Aug 2019 09:22)  HR: 93 (13 Aug 2019 16:30) (81 - 95)  BP: 152/98 (13 Aug 2019 18:11) (118/85 - 174/107)  BP(mean): 91 (13 Aug 2019 14:00) (91 - 103)  RR: 17 (13 Aug 2019 14:00) (13 - 22)  SpO2: 98% (13 Aug 2019 16:30) (96% - 98%)      Labs                      14.4   10.19 )-----------( 175      ( 13 Aug 2019 08:55 )             43.3     -    139  |  102  |  8   ----------------------------<  74  4.2   |  21<L>  |  0.64    Ca    9.4      13 Aug 2019 08:55    TPro  7.0  /  Alb  3.5  /  TBili  0.2  /  DBili  x   /  AST  21  /  ALT  12  /  AlkPhos  181<H>        Assessment  40y/o G P2. Tray @ 1052 postpartum  section for repeat. EBL 500cc. History of PEC 2017 delivered at 37weeks and PEC with this pregnancy hospitalized 19 and 19 discharge on no antihypertensive. History of 2 small fibroids. History of alcohol and marijuana use 15years ago. Alert and orientedx3. Patient denies any pain at this time. Lungs sounds clear bilaterally. No distress noted. Abdomen soft, nontender, nondistended. Positive bowel sounds. Abdominal dressing clean, dry and intact. Fundus firm. Hunter in place. Lochia light rubra.    Plan:  Spoke to Dr. Colon about assessment and patient's refusal of magnesium sulfate.  Plan:  Keppra to be ordered  Repeat vital sign 1 hour as previously states. If blood pressure continue to be labile will consider labetalol.  Will continue to monitor.       .

## 2019-08-13 NOTE — DISCHARGE NOTE OB - CARE PROVIDER_API CALL
Luis Colon)  Obstetrics and Gynecology  [address] [place] [address]  Phone: [phone]  Fax: [phone]  Follow Up Time:

## 2019-08-13 NOTE — DISCHARGE NOTE OB - CONDITION (STATED IN TERMS THAT PERMIT A SPECIFIC MEASURABLE COMPARISON WITH CONDITION ON ADMISSION):
Female Completion Statement: After discussing her treatment course we decided to discontinue isotretinoin therapy at this time. I explained that she would need to continue her birth control methods for at least one month after the last dosage. She should also get a pregnancy test one month after the last dose. She shouldn't donate blood for one month after the last dose. She should call with any new symptoms of depression. Stable

## 2019-08-13 NOTE — BRIEF OPERATIVE NOTE - OPERATION/FINDINGS
repeat LTCS, uncomplicated  viable female infant, vertex presentation, Apgars 9/9, cord gasses sent  Grossly normal fallopian tubes, uterus, and ovaries

## 2019-08-13 NOTE — OB RN PATIENT PROFILE - NSSUBSTANCEUSE_GEN_ALL_CORE_SD
Pt reports she had problem with marijuana and  alcohol 15v years ago/caffeine/street drug/inhalant/medication abuse

## 2019-08-13 NOTE — OB PROVIDER DELIVERY SUMMARY - NSPROVIDERDELIVERYNOTE_OBGYN_ALL_OB_FT
repeat LTCS, uncomplicated  viable female infant, vertex presentation, Apgars 9/9, cord gasses sent  Grossly normal fallopian tubes, uterus, and ovaries    EBL: 500  IVF: 2000  UOP: 60    CRISTA Gasca PGY2  w/ Dr. Bruce & Isaiah

## 2019-08-13 NOTE — DISCHARGE NOTE OB - MEDICATION SUMMARY - MEDICATIONS TO TAKE
I will START or STAY ON the medications listed below when I get home from the hospital:    vitamin D  -- 1000 international unit(s) by mouth once a day  -- Indication: For Vitamin    prenatal vitamin  -- 1 tab oral daily. last dose 8/10/19  -- Indication: For Vitamin I will START or STAY ON the medications listed below when I get home from the hospital:    vitamin D  -- 1000 international unit(s) by mouth once a day  -- Indication: For Vitamin    prenatal vitamin  -- 1 tab oral daily. last dose 8/10/19  -- Indication: For Vitamin    ibuprofen 600 mg oral tablet  -- 1 tab(s) by mouth every 6 hours  -- Indication: For pain, as needed    Prenatal Multivitamins with Folic Acid 1 mg oral tablet  -- 1 tab(s) by mouth once a day  -- Indication: For supplement

## 2019-08-13 NOTE — OB RN PATIENT PROFILE - ANESTHESIA, PREVIOUS REACTION, PROFILE
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  pt does not want narcotics that could make her feel "high"/none

## 2019-08-13 NOTE — DISCHARGE NOTE OB - CARE PLAN
Principal Discharge DX:	 delivery delivered  Goal:	recovery  Assessment and plan of treatment:	PO Care  Secondary Diagnosis:	History of  delivery  Secondary Diagnosis:	Preeclampsia, third trimester

## 2019-08-13 NOTE — DISCHARGE NOTE OB - PATIENT PORTAL LINK FT
You can access the [facility], offered by [facility], by registering with the following website: [email or web address]
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
## 2019-08-13 NOTE — OB PROVIDER H&P - HISTORY OF PRESENT ILLNESS
38 y/o female  with hx of preeclampsia during previous pregnancy with emergent  performed after failed induction at 37 weeks gestation.  Pt now with EDC 19, reports she was told she has preeclampsia in her current pregnancy, BP's have been WNL per pt.  Pt reports she received steroid injections x2 1 week ago. Now scheduled for Repeat  section 19.     Pt reports she feels baby moving today as usual 40 y/o  at 37w1d here for scheduled rLTCS for PEC. Patient denies LOF, VB, CTX. +FM. PNC c/b PEC with ante admission - s/p BMZ x2, no oral antihypertensives at this time. Patient denies HA, blurry vision, RUQ pain at this time.     OBHx: 2018 emergent c/s at 37w for sPEC / failed IOL  GynHx: 2 small fibroids, denies cysts, abnormal paps, STDs  PMH: denies  PSH: c/s x1  Meds: denies  All: NKDA  Soc: no substance use, anxiety/depression    accepts blood products

## 2019-08-13 NOTE — OB PROVIDER H&P - PROBLEM SELECTOR PLAN 1
- admit  - routine labs  - IVF, NPO  - FHT cat 1  - BP monitoring  - HELLP labs    CRISTA Gasca PGY2  d/w Dr. Colon

## 2019-08-13 NOTE — OB PROVIDER DELIVERY SUMMARY - NSPPHNORISK_OBGYN_ALL_OB
Tazorac Counseling:  Patient advised that medication is irritating and drying.  Patient may need to apply sparingly and wash off after an hour before eventually leaving it on overnight.  The patient verbalized understanding of the proper use and possible adverse effects of tazorac.  All of the patient's questions and concerns were addressed. Benzoyl Peroxide Pregnancy And Lactation Text: This medication is Pregnancy Category C. It is unknown if benzoyl peroxide is excreted in breast milk. Dapsone Counseling: I discussed with the patient the risks of dapsone including but not limited to hemolytic anemia, agranulocytosis, rashes, methemoglobinemia, kidney failure, peripheral neuropathy, headaches, GI upset, and liver toxicity.  Patients who start dapsone require monitoring including baseline LFTs and weekly CBCs for the first month, then every month thereafter.  The patient verbalized understanding of the proper use and possible adverse effects of dapsone.  All of the patient's questions and concerns were addressed. Tetracycline Counseling: Patient counseled regarding possible photosensitivity and increased risk for sunburn.  Patient instructed to avoid sunlight, if possible.  When exposed to sunlight, patients should wear protective clothing, sunglasses, and sunscreen.  The patient was instructed to call the office immediately if the following severe adverse effects occur:  hearing changes, easy bruising/bleeding, severe headache, or vision changes.  The patient verbalized understanding of the proper use and possible adverse effects of tetracycline.  All of the patient's questions and concerns were addressed. Patient understands to avoid pregnancy while on therapy due to potential birth defects. In my judgment no risk for PPH has been identified at this time. Topical Clindamycin Pregnancy And Lactation Text: This medication is Pregnancy Category B and is considered safe during pregnancy. It is unknown if it is excreted in breast milk. Topical Sulfur Applications Pregnancy And Lactation Text: This medication is Pregnancy Category C and has an unknown safety profile during pregnancy. It is unknown if this topical medication is excreted in breast milk. Spironolactone Pregnancy And Lactation Text: This medication can cause feminization of the male fetus and should be avoided during pregnancy. The active metabolite is also found in breast milk. Bactrim Counseling:  I discussed with the patient the risks of sulfa antibiotics including but not limited to GI upset, allergic reaction, drug rash, diarrhea, dizziness, photosensitivity, and yeast infections.  Rarely, more serious reactions can occur including but not limited to aplastic anemia, agranulocytosis, methemoglobinemia, blood dyscrasias, liver or kidney failure, lung infiltrates or desquamative/blistering drug rashes. High Dose Vitamin A Pregnancy And Lactation Text: High dose vitamin A therapy is contraindicated during pregnancy and breast feeding. Use Enhanced Medication Counseling?: No Spironolactone Counseling: Patient advised regarding risks of diarrhea, abdominal pain, hyperkalemia, birth defects (for female patients), liver toxicity and renal toxicity. The patient may need blood work to monitor liver and kidney function and potassium levels while on therapy. The patient verbalized understanding of the proper use and possible adverse effects of spironolactone.  All of the patient's questions and concerns were addressed. Birth Control Pills Pregnancy And Lactation Text: This medication should be avoided if pregnant and for the first 30 days post-partum. Azithromycin Pregnancy And Lactation Text: This medication is considered safe during pregnancy and is also secreted in breast milk. Benzoyl Peroxide Counseling: Patient counseled that medicine may cause skin irritation and bleach clothing.  In the event of skin irritation, the patient was advised to reduce the amount of the drug applied or use it less frequently.   The patient verbalized understanding of the proper use and possible adverse effects of benzoyl peroxide.  All of the patient's questions and concerns were addressed. Azithromycin Counseling:  I discussed with the patient the risks of azithromycin including but not limited to GI upset, allergic reaction, drug rash, diarrhea, and yeast infections. Doxycycline Pregnancy And Lactation Text: This medication is Pregnancy Category D and not consider safe during pregnancy. It is also excreted in breast milk but is considered safe for shorter treatment courses. High Dose Vitamin A Counseling: Side effects reviewed, pt to contact office should one occur. Minocycline Pregnancy And Lactation Text: This medication is Pregnancy Category D and not consider safe during pregnancy. It is also excreted in breast milk. Bactrim Pregnancy And Lactation Text: This medication is Pregnancy Category D and is known to cause fetal risk.  It is also excreted in breast milk. Birth Control Pills Counseling: Birth Control Pill Counseling: I discussed with the patient the potential side effects of OCPs including but not limited to increased risk of stroke, heart attack, thrombophlebitis, deep venous thrombosis, hepatic adenomas, breast changes, GI upset, headaches, and depression.  The patient verbalized understanding of the proper use and possible adverse effects of OCPs. All of the patient's questions and concerns were addressed. Topical Retinoid counseling:  Patient advised to apply a pea-sized amount only at bedtime and wait 30 minutes after washing their face before applying.  If too drying, patient may add a non-comedogenic moisturizer. The patient verbalized understanding of the proper use and possible adverse effects of retinoids.  All of the patient's questions and concerns were addressed. Doxycycline Counseling:  Patient counseled regarding possible photosensitivity and increased risk for sunburn.  Patient instructed to avoid sunlight, if possible.  When exposed to sunlight, patients should wear protective clothing, sunglasses, and sunscreen.  The patient was instructed to call the office immediately if the following severe adverse effects occur:  hearing changes, easy bruising/bleeding, severe headache, or vision changes.  The patient verbalized understanding of the proper use and possible adverse effects of doxycycline.  All of the patient's questions and concerns were addressed. Dapsone Pregnancy And Lactation Text: This medication is Pregnancy Category C and is not considered safe during pregnancy or breast feeding. Topical Retinoid Pregnancy And Lactation Text: This medication is Pregnancy Category C. It is unknown if this medication is excreted in breast milk. Detail Level: Zone Topical Clindamycin Counseling: Patient counseled that this medication may cause skin irritation or allergic reactions.  In the event of skin irritation, the patient was advised to reduce the amount of the drug applied or use it less frequently.   The patient verbalized understanding of the proper use and possible adverse effects of clindamycin.  All of the patient's questions and concerns were addressed. Minocycline Counseling: Patient advised regarding possible photosensitivity and discoloration of the teeth, skin, lips, tongue and gums.  Patient instructed to avoid sunlight, if possible.  When exposed to sunlight, patients should wear protective clothing, sunglasses, and sunscreen.  The patient was instructed to call the office immediately if the following severe adverse effects occur:  hearing changes, easy bruising/bleeding, severe headache, or vision changes.  The patient verbalized understanding of the proper use and possible adverse effects of minocycline.  All of the patient's questions and concerns were addressed. Erythromycin Pregnancy And Lactation Text: This medication is Pregnancy Category B and is considered safe during pregnancy. It is also excreted in breast milk. Tazorac Pregnancy And Lactation Text: This medication is not safe during pregnancy. It is unknown if this medication is excreted in breast milk. Isotretinoin Counseling: Patient should get monthly blood tests, not donate blood, not drive at night if vision affected, not share medication, and not undergo elective surgery for 6 months after tx completed. Side effects reviewed, pt to contact office should one occur. Topical Sulfur Applications Counseling: Topical Sulfur Counseling: Patient counseled that this medication may cause skin irritation or allergic reactions.  In the event of skin irritation, the patient was advised to reduce the amount of the drug applied or use it less frequently.   The patient verbalized understanding of the proper use and possible adverse effects of topical sulfur application.  All of the patient's questions and concerns were addressed. Erythromycin Counseling:  I discussed with the patient the risks of erythromycin including but not limited to GI upset, allergic reaction, drug rash, diarrhea, increase in liver enzymes, and yeast infections. Isotretinoin Pregnancy And Lactation Text: This medication is Pregnancy Category X and is considered extremely dangerous during pregnancy. It is unknown if it is excreted in breast milk.

## 2019-08-14 LAB
ALBUMIN SERPL ELPH-MCNC: 2.9 G/DL — LOW (ref 3.3–5)
ALP SERPL-CCNC: 126 U/L — HIGH (ref 40–120)
ALT FLD-CCNC: 10 U/L — SIGNIFICANT CHANGE UP (ref 4–33)
ANION GAP SERPL CALC-SCNC: 11 MMO/L — SIGNIFICANT CHANGE UP (ref 7–14)
APTT BLD: 26.3 SEC — LOW (ref 27.5–36.3)
AST SERPL-CCNC: 22 U/L — SIGNIFICANT CHANGE UP (ref 4–32)
BASOPHILS # BLD AUTO: 0.04 K/UL — SIGNIFICANT CHANGE UP (ref 0–0.2)
BASOPHILS NFR BLD AUTO: 0.3 % — SIGNIFICANT CHANGE UP (ref 0–2)
BILIRUB SERPL-MCNC: < 0.2 MG/DL — LOW (ref 0.2–1.2)
BLD GP AB SCN SERPL QL: NEGATIVE — SIGNIFICANT CHANGE UP
BUN SERPL-MCNC: 10 MG/DL — SIGNIFICANT CHANGE UP (ref 7–23)
CALCIUM SERPL-MCNC: 8.9 MG/DL — SIGNIFICANT CHANGE UP (ref 8.4–10.5)
CHLORIDE SERPL-SCNC: 103 MMOL/L — SIGNIFICANT CHANGE UP (ref 98–107)
CO2 SERPL-SCNC: 23 MMOL/L — SIGNIFICANT CHANGE UP (ref 22–31)
CREAT SERPL-MCNC: 0.61 MG/DL — SIGNIFICANT CHANGE UP (ref 0.5–1.3)
EOSINOPHIL # BLD AUTO: 0.03 K/UL — SIGNIFICANT CHANGE UP (ref 0–0.5)
EOSINOPHIL NFR BLD AUTO: 0.2 % — SIGNIFICANT CHANGE UP (ref 0–6)
FIBRINOGEN PPP-MCNC: 569.6 MG/DL — HIGH (ref 350–510)
GLUCOSE SERPL-MCNC: 105 MG/DL — HIGH (ref 70–99)
HCT VFR BLD CALC: 32.2 % — LOW (ref 34.5–45)
HGB BLD-MCNC: 10.7 G/DL — LOW (ref 11.5–15.5)
IMM GRANULOCYTES NFR BLD AUTO: 0.5 % — SIGNIFICANT CHANGE UP (ref 0–1.5)
INR BLD: 0.89 — SIGNIFICANT CHANGE UP (ref 0.88–1.17)
LDH SERPL L TO P-CCNC: 240 U/L — HIGH (ref 135–225)
LYMPHOCYTES # BLD AUTO: 1.45 K/UL — SIGNIFICANT CHANGE UP (ref 1–3.3)
LYMPHOCYTES # BLD AUTO: 10.8 % — LOW (ref 13–44)
MCHC RBC-ENTMCNC: 30.4 PG — SIGNIFICANT CHANGE UP (ref 27–34)
MCHC RBC-ENTMCNC: 33.2 % — SIGNIFICANT CHANGE UP (ref 32–36)
MCV RBC AUTO: 91.5 FL — SIGNIFICANT CHANGE UP (ref 80–100)
MONOCYTES # BLD AUTO: 0.82 K/UL — SIGNIFICANT CHANGE UP (ref 0–0.9)
MONOCYTES NFR BLD AUTO: 6.1 % — SIGNIFICANT CHANGE UP (ref 2–14)
NEUTROPHILS # BLD AUTO: 10.96 K/UL — HIGH (ref 1.8–7.4)
NEUTROPHILS NFR BLD AUTO: 82.1 % — HIGH (ref 43–77)
NRBC # FLD: 0.03 K/UL — SIGNIFICANT CHANGE UP (ref 0–0)
PLATELET # BLD AUTO: 154 K/UL — SIGNIFICANT CHANGE UP (ref 150–400)
PMV BLD: 12.7 FL — SIGNIFICANT CHANGE UP (ref 7–13)
POTASSIUM SERPL-MCNC: 4.3 MMOL/L — SIGNIFICANT CHANGE UP (ref 3.5–5.3)
POTASSIUM SERPL-SCNC: 4.3 MMOL/L — SIGNIFICANT CHANGE UP (ref 3.5–5.3)
PROT SERPL-MCNC: 5.4 G/DL — LOW (ref 6–8.3)
PROTHROM AB SERPL-ACNC: 9.8 SEC — SIGNIFICANT CHANGE UP (ref 9.8–13.1)
RBC # BLD: 3.52 M/UL — LOW (ref 3.8–5.2)
RBC # FLD: 13.2 % — SIGNIFICANT CHANGE UP (ref 10.3–14.5)
RH IG SCN BLD-IMP: POSITIVE — SIGNIFICANT CHANGE UP
SODIUM SERPL-SCNC: 137 MMOL/L — SIGNIFICANT CHANGE UP (ref 135–145)
URATE SERPL-MCNC: 5.2 MG/DL — SIGNIFICANT CHANGE UP (ref 2.5–7)
WBC # BLD: 13.37 K/UL — HIGH (ref 3.8–10.5)
WBC # FLD AUTO: 13.37 K/UL — HIGH (ref 3.8–10.5)

## 2019-08-14 RX ORDER — FERROUS SULFATE 325(65) MG
325 TABLET ORAL DAILY
Refills: 0 | Status: DISCONTINUED | OUTPATIENT
Start: 2019-08-14 | End: 2019-08-16

## 2019-08-14 RX ORDER — IBUPROFEN 200 MG
600 TABLET ORAL EVERY 6 HOURS
Refills: 0 | Status: DISCONTINUED | OUTPATIENT
Start: 2019-08-14 | End: 2019-08-16

## 2019-08-14 RX ORDER — LABETALOL HCL 100 MG
200 TABLET ORAL EVERY 8 HOURS
Refills: 0 | Status: DISCONTINUED | OUTPATIENT
Start: 2019-08-14 | End: 2019-08-16

## 2019-08-14 RX ADMIN — HEPARIN SODIUM 5000 UNIT(S): 5000 INJECTION INTRAVENOUS; SUBCUTANEOUS at 06:43

## 2019-08-14 RX ADMIN — Medication 30 MILLIGRAM(S): at 06:43

## 2019-08-14 RX ADMIN — LEVETIRACETAM 500 MILLIGRAM(S): 250 TABLET, FILM COATED ORAL at 10:22

## 2019-08-14 RX ADMIN — Medication 975 MILLIGRAM(S): at 15:01

## 2019-08-14 RX ADMIN — Medication 30 MILLIGRAM(S): at 13:00

## 2019-08-14 RX ADMIN — Medication 325 MILLIGRAM(S): at 12:10

## 2019-08-14 RX ADMIN — Medication 200 MILLIGRAM(S): at 22:02

## 2019-08-14 RX ADMIN — Medication 975 MILLIGRAM(S): at 16:00

## 2019-08-14 RX ADMIN — Medication 30 MILLIGRAM(S): at 12:10

## 2019-08-14 RX ADMIN — Medication 30 MILLIGRAM(S): at 17:46

## 2019-08-14 RX ADMIN — HEPARIN SODIUM 5000 UNIT(S): 5000 INJECTION INTRAVENOUS; SUBCUTANEOUS at 17:46

## 2019-08-14 RX ADMIN — Medication 975 MILLIGRAM(S): at 22:02

## 2019-08-14 RX ADMIN — Medication 975 MILLIGRAM(S): at 21:18

## 2019-08-14 RX ADMIN — Medication 1 TABLET(S): at 12:10

## 2019-08-14 RX ADMIN — Medication 30 MILLIGRAM(S): at 18:30

## 2019-08-14 NOTE — PROGRESS NOTE ADULT - SUBJECTIVE AND OBJECTIVE BOX
ANESTHESIA POSTOP CHECK    39y Female POSTOP DAY 1 S/P     Vital Signs Last 24 Hrs  T(C): 36.7 (14 Aug 2019 05:15), Max: 37.1 (13 Aug 2019 09:22)  T(F): 98.1 (14 Aug 2019 05:15), Max: 98.8 (13 Aug 2019 09:22)  HR: 93 (14 Aug 2019 05:15) (81 - 100)  BP: 110/71 (14 Aug 2019 05:15) (110/71 - 174/107)  BP(mean): 91 (13 Aug 2019 14:00) (91 - 103)  RR: 18 (14 Aug 2019 05:15) (13 - 22)  SpO2: 96% (14 Aug 2019 05:15) (95% - 98%)  I&O's Summary    13 Aug 2019 07:01  -  14 Aug 2019 07:00  --------------------------------------------------------  IN: 4200 mL / OUT: 961 mL / NET: 3239 mL        [X ] NO APPARENT ANESTHESIA COMPLICATIONS      Comments:

## 2019-08-14 NOTE — PROGRESS NOTE ADULT - ASSESSMENT
A/P: 40yo POD#1 s/p LTCS c/b sPEC, refusing Mag, on Keppra and Labetalol 200 TID. BPs well controlled overnight. Patient is stable and doing well post-operatively.

## 2019-08-14 NOTE — CHART NOTE - NSCHARTNOTEFT_GEN_A_CORE
Backnote due to clinical activites:    Spoke to Dr. Baeza @ 2110 about patient's status, history and recent vital signs.    BPs 136/96 P100    Patient refused magnesium sulfate and had agreed to take Keppra 500mg BID as well as antihypertensive meds.  As discussed with Dr. Baeza patient will be started on labetalol 200mg TID.  Hellp labs to be repeated @ 0600.

## 2019-08-14 NOTE — PROGRESS NOTE ADULT - PROBLEM SELECTOR PLAN 1
- Continue Keppra x24 hours  - Continue Labetalol 200 TID  - Continue regular diet.  - Increase ambulation.  - Continue motrin, tylenol, oxycodone PRN for pain control  - F/u AM CBC    Carie Gasca, PGY2  Pager #[phone]

## 2019-08-14 NOTE — LACTATION INITIAL EVALUATION - LACTATION INTERVENTIONS
Assisted with deeper latch and proper positioning.  Reviewed normal  feeding behaviors.  Educated in use of the NEW BEGINNINGS book.  Encouraged to keep feeding log and to feed on cue.  Reviewed safe skin to skin and the benefits of exclusive breastfeeding./initiate skin to skin/initiate hand expression routine

## 2019-08-15 RX ORDER — IBUPROFEN 200 MG
1 TABLET ORAL
Qty: 0 | Refills: 0 | DISCHARGE
Start: 2019-08-15

## 2019-08-15 RX ADMIN — Medication 600 MILLIGRAM(S): at 12:40

## 2019-08-15 RX ADMIN — HEPARIN SODIUM 5000 UNIT(S): 5000 INJECTION INTRAVENOUS; SUBCUTANEOUS at 18:10

## 2019-08-15 RX ADMIN — Medication 200 MILLIGRAM(S): at 14:24

## 2019-08-15 RX ADMIN — Medication 600 MILLIGRAM(S): at 01:00

## 2019-08-15 RX ADMIN — Medication 600 MILLIGRAM(S): at 00:28

## 2019-08-15 RX ADMIN — Medication 975 MILLIGRAM(S): at 04:20

## 2019-08-15 RX ADMIN — Medication 200 MILLIGRAM(S): at 21:56

## 2019-08-15 RX ADMIN — Medication 975 MILLIGRAM(S): at 03:48

## 2019-08-15 RX ADMIN — Medication 600 MILLIGRAM(S): at 06:33

## 2019-08-15 RX ADMIN — Medication 975 MILLIGRAM(S): at 21:56

## 2019-08-15 RX ADMIN — Medication 975 MILLIGRAM(S): at 14:25

## 2019-08-15 RX ADMIN — Medication 975 MILLIGRAM(S): at 15:10

## 2019-08-15 RX ADMIN — Medication 325 MILLIGRAM(S): at 11:56

## 2019-08-15 RX ADMIN — Medication 200 MILLIGRAM(S): at 06:34

## 2019-08-15 RX ADMIN — Medication 600 MILLIGRAM(S): at 07:10

## 2019-08-15 RX ADMIN — Medication 975 MILLIGRAM(S): at 21:11

## 2019-08-15 RX ADMIN — Medication 600 MILLIGRAM(S): at 18:10

## 2019-08-15 RX ADMIN — HEPARIN SODIUM 5000 UNIT(S): 5000 INJECTION INTRAVENOUS; SUBCUTANEOUS at 06:33

## 2019-08-15 RX ADMIN — Medication 1 TABLET(S): at 11:56

## 2019-08-15 RX ADMIN — Medication 600 MILLIGRAM(S): at 11:56

## 2019-08-15 NOTE — PROGRESS NOTE ADULT - PROBLEM SELECTOR PLAN 1
- Continue regular diet.  - Increase ambulation.  - Continue motrin, tylenol, oxycodone PRN for pain control  - Continue Labetalol    Carie Gasca, PGY2  Pager #[phone]

## 2019-08-15 NOTE — PROGRESS NOTE ADULT - ASSESSMENT
A/P: 38yo POD#2 s/p LTCS c/b sPEC, refused Mag, s/p Keppra, on Labetalol 200 TID. BPs well controlled overnight. Patient is stable and doing well post-operatively.

## 2019-08-15 NOTE — PROGRESS NOTE ADULT - SUBJECTIVE AND OBJECTIVE BOX
OB Progress Note: LTCS, POD#2    S: 40yo POD#2 s/p LTCS c/b sPEC, refused Mag, s/p Keppra, on Labetalol 200 TID. Pain is well controlled. She is tolerating a regular diet and passing flatus. She is voiding spontaneously, and ambulating without difficulty. Denies CP/SOB. Denies lightheadedness/dizziness. Denies N/V. Denies HA, blurry vision, RUQ Pain.    O:  Vitals:  Vital Signs Last 24 Hrs  T(C): 36.6 (15 Aug 2019 06:30), Max: 36.9 (14 Aug 2019 10:09)  T(F): 97.9 (15 Aug 2019 06:30), Max: 98.4 (14 Aug 2019 10:09)  HR: 78 (15 Aug 2019 06:30) (78 - 105)  BP: 125/77 (15 Aug 2019 06:30) (100/62 - 135/89)  BP(mean): --  RR: 18 (15 Aug 2019 06:30) (16 - 18)  SpO2: 99% (15 Aug 2019 06:30) (97% - 100%)    MEDICATIONS  (STANDING):  acetaminophen   Tablet .. 975 milliGRAM(s) Oral <User Schedule>  diphtheria/tetanus/pertussis (acellular) Vaccine (ADAcel) 0.5 milliLiter(s) IntraMuscular once  ferrous    sulfate 325 milliGRAM(s) Oral daily  heparin  Injectable 5000 Unit(s) SubCutaneous every 12 hours  ibuprofen  Tablet. 600 milliGRAM(s) Oral every 6 hours  labetalol 200 milliGRAM(s) Oral every 8 hours  lactated ringers. 1000 milliLiter(s) (125 mL/Hr) IV Continuous <Continuous>  lactated ringers. 1000 milliLiter(s) (50 mL/Hr) IV Continuous <Continuous>  lactated ringers. 1000 milliLiter(s) (200 mL/Hr) IV Continuous <Continuous>  oxytocin Infusion 50 milliUNIT(s)/Min (150 mL/Hr) IV Continuous <Continuous>  prenatal multivitamin 1 Tablet(s) Oral daily      MEDICATIONS  (PRN):  dexamethasone  Injectable 4 milliGRAM(s) IV Push every 6 hours PRN Nausea  diphenhydrAMINE 25 milliGRAM(s) Oral every 6 hours PRN Itching  docusate sodium 100 milliGRAM(s) Oral two times a day PRN Stool softening  glycerin Suppository - Adult 1 Suppository(s) Rectal at bedtime PRN Constipation  HYDROmorphone  Injectable 1 milliGRAM(s) IV Push every 10 minutes PRN Severe Pain (7 - 10)  lanolin Ointment 1 Application(s) Topical every 6 hours PRN Sore Nipples  magnesium hydroxide Suspension 30 milliLiter(s) Oral two times a day PRN Constipation  naloxone Injectable 0.1 milliGRAM(s) IV Push every 3 minutes PRN For ANY of the following changes in patient status:  A. Breaths Per Minute LESS THAN 10, B. Oxygen saturation LESS THAN 90%, C. Sedation score of 6 for Stop After: 4 Times  ondansetron Injectable 4 milliGRAM(s) IV Push every 6 hours PRN Nausea  ondansetron Injectable 4 milliGRAM(s) IV Push once PRN Nausea and/or Vomiting  oxyCODONE    IR 5 milliGRAM(s) Oral every 3 hours PRN Moderate to Severe Pain (4-10)  oxyCODONE    IR 5 milliGRAM(s) Oral once PRN Moderate to Severe Pain (4-10)  simethicone 80 milliGRAM(s) Chew every 4 hours PRN Gas      Labs:  Blood type: A Positive  Rubella IgG: RPR: Negative                          10.7<L>   13.37<H> >-----------< 154    ( 08-14 @ 06:10 )             32.2<L>                        14.4   10.19 >-----------< 175    ( 08-13 @ 08:55 )             43.3                        12.5   8.74 >-----------< 161    ( 08-12 @ 14:11 )             39.1    08-14-19 @ 06:10      137  |  103  |  10  ----------------------------<  105<H>  4.3   |  23  |  0.61    08-13-19 @ 08:55      139  |  102  |  8   ----------------------------<  74  4.2   |  21<L>  |  0.64    08-12-19 @ 14:11      138  |  100  |  11  ----------------------------<  92  4.0   |  22  |  0.64        Ca    8.9      14 Aug 2019 06:10  Ca    9.4      13 Aug 2019 08:55  Ca    9.8      12 Aug 2019 14:11    TPro  5.4<L>  /  Alb  2.9<L>  /  TBili  < 0.2<L>  /  DBili  x   /  AST  22  /  ALT  10  /  AlkPhos  126<H>  08-14-19 @ 06:10  TPro  7.0  /  Alb  3.5  /  TBili  0.2  /  DBili  x   /  AST  21  /  ALT  12  /  AlkPhos  181<H>  08-13-19 @ 08:55  TPro  6.2  /  Alb  3.3  /  TBili  < 0.2<L>  /  DBili  x   /  AST  17  /  ALT  11  /  AlkPhos  167<H>  08-12-19 @ 14:11          PE:  General: NAD  Abdomen: Soft, appropriately tender, incision c/d/i.  Extremities: No erythema, no pitting edema

## 2019-08-16 VITALS
TEMPERATURE: 98 F | SYSTOLIC BLOOD PRESSURE: 126 MMHG | HEART RATE: 82 BPM | OXYGEN SATURATION: 98 % | RESPIRATION RATE: 17 BRPM | DIASTOLIC BLOOD PRESSURE: 66 MMHG

## 2019-08-16 RX ADMIN — Medication 600 MILLIGRAM(S): at 06:23

## 2019-08-16 RX ADMIN — Medication 975 MILLIGRAM(S): at 04:29

## 2019-08-16 RX ADMIN — Medication 600 MILLIGRAM(S): at 06:56

## 2019-08-16 RX ADMIN — Medication 600 MILLIGRAM(S): at 00:24

## 2019-08-16 RX ADMIN — Medication 200 MILLIGRAM(S): at 06:23

## 2019-08-16 RX ADMIN — HEPARIN SODIUM 5000 UNIT(S): 5000 INJECTION INTRAVENOUS; SUBCUTANEOUS at 06:22

## 2019-08-16 RX ADMIN — Medication 975 MILLIGRAM(S): at 03:45

## 2019-08-16 RX ADMIN — Medication 600 MILLIGRAM(S): at 00:55

## 2019-08-16 NOTE — PROGRESS NOTE ADULT - PROBLEM SELECTOR PLAN 1
- Continue motrin, tylenol, oxycodone PRN for pain control.  - Increase ambulation  - Continue regular diet  - Discharge planning    MS [id number] - Continue motrin, tylenol, oxycodone PRN for pain control.  - Increase ambulation  - Continue regular diet  - Discharge planning    Doni, MS3    Agree with medical student note. Pt seen and examined at bedside  Jazmin, PGY-1

## 2019-08-16 NOTE — PROGRESS NOTE ADULT - ASSESSMENT
A/P: 40yo POD#3 s/p rLTCS, c/b sPEC, refused Mg, s/p Keppra, on Labetalol 200 TID. BPs well controlled overnight.  Patient is stable and is doing well post-operatively.

## 2019-08-16 NOTE — PROGRESS NOTE ADULT - SUBJECTIVE AND OBJECTIVE BOX
OB Postpartum Note:  Delivery, POD#3    S: 40yo POD#3 s/p rLTCS c/b sPEC, refused Mg, s/p Keppra, on labetalol 200 TID. The patient was seen at the bedside and she feels well.  Pain is well controlled, tolerating a regular diet,  passing flatus, voiding spontaneously, and ambulating without difficulty. Denies heavy vaginal bleeding, CP/SOB, lightheadedness/dizziness, N/V. Denies headache, blurry vision, RUQ pain.    O:  Vitals:  Vital Signs Last 24 Hrs  T(C): 36.6 (16 Aug 2019 05:40), Max: 37 (15 Aug 2019 18:15)  T(F): 97.9 (16 Aug 2019 05:40), Max: 98.6 (15 Aug 2019 18:15)  HR: 82 (16 Aug 2019 05:40) (81 - 93)  BP: 126/66 (16 Aug 2019 05:40) (126/66 - 139/88)  BP(mean): --  RR: 17 (16 Aug 2019 05:40) (17 - 18)  SpO2: 98% (16 Aug 2019 05:40) (98% - 99%)    MEDICATIONS  (STANDING):  acetaminophen   Tablet .. 975 milliGRAM(s) Oral <User Schedule>  diphtheria/tetanus/pertussis (acellular) Vaccine (ADAcel) 0.5 milliLiter(s) IntraMuscular once  ferrous    sulfate 325 milliGRAM(s) Oral daily  heparin  Injectable 5000 Unit(s) SubCutaneous every 12 hours  ibuprofen  Tablet. 600 milliGRAM(s) Oral every 6 hours  labetalol 200 milliGRAM(s) Oral every 8 hours  prenatal multivitamin 1 Tablet(s) Oral daily    MEDICATIONS  (PRN):  diphenhydrAMINE 25 milliGRAM(s) Oral every 6 hours PRN Itching  docusate sodium 100 milliGRAM(s) Oral two times a day PRN Stool softening  glycerin Suppository - Adult 1 Suppository(s) Rectal at bedtime PRN Constipation  lanolin Ointment 1 Application(s) Topical every 6 hours PRN Sore Nipples  magnesium hydroxide Suspension 30 milliLiter(s) Oral two times a day PRN Constipation  oxyCODONE    IR 5 milliGRAM(s) Oral every 3 hours PRN Moderate to Severe Pain (4-10)  oxyCODONE    IR 5 milliGRAM(s) Oral once PRN Moderate to Severe Pain (4-10)  simethicone 80 milliGRAM(s) Chew every 4 hours PRN Gas      LABS:  Blood type: A Positive  Rubella IgG: RPR: Negative                          10.7<L>   13.37<H> >-----------< 154    (  @ 06:10 )             32.2<L>                        14.4   10.19 >-----------< 175    (  @ 08:55 )             43.3    19 @ 06:10      137  |  103  |  10  ----------------------------<  105<H>  4.3   |  23  |  0.61    19 @ 08:55      139  |  102  |  8   ----------------------------<  74  4.2   |  21<L>  |  0.64        Ca    8.9      14 Aug 2019 06:10  Ca    9.4      13 Aug 2019 08:55    TPro  5.4<L>  /  Alb  2.9<L>  /  TBili  < 0.2<L>  /  DBili  x   /  AST  22  /  ALT  10  /  AlkPhos  126<H>  19 @ 06:10  TPro  7.0  /  Alb  3.5  /  TBili  0.2  /  DBili  x   /  AST  21  /  ALT  12  /  AlkPhos  181<H>  19 @ 08:55          Physical exam:  Gen: NAD  Abdomen: Soft, nontender, no distension , firm uterine fundus at umbilicus.  Incision: Clean, dry, and intact   VE: No heavy vaginal bleeding  Ext: No calf tenderness

## 2019-09-06 DIAGNOSIS — O13.3 GESTATIONAL [PREGNANCY-INDUCED] HYPERTENSION WITHOUT SIGNIFICANT PROTEINURIA, THIRD TRIMESTER: ICD-10-CM

## 2019-09-06 DIAGNOSIS — O09.293 SUPERVISION OF PREGNANCY WITH OTHER POOR REPRODUCTIVE OR OBSTETRIC HISTORY, THIRD TRIMESTER: ICD-10-CM

## 2019-09-06 DIAGNOSIS — Z3A.36 36 WEEKS GESTATION OF PREGNANCY: ICD-10-CM

## 2019-09-06 DIAGNOSIS — O09.513 SUPERVISION OF ELDERLY PRIMIGRAVIDA, THIRD TRIMESTER: ICD-10-CM

## 2019-09-06 PROBLEM — Z78.9 OTHER SPECIFIED HEALTH STATUS: Chronic | Status: INACTIVE | Noted: 2019-08-06 | Resolved: 2019-08-12

## 2021-01-01 NOTE — OB PROVIDER TRIAGE NOTE - NSOBPROVIDERNOTE_OBGYN_ALL_OB_FT
Statement Selected
Discussed with Dr. Pembreton and Dr. Samuel:   -Admit to labor and delivery for Bp Monitoring, Betamethasone, 24 Hour urine Protein   -GBS Pending  -Routine labs pending

## 2021-03-22 NOTE — LACTATION INITIAL EVALUATION - LATCH: AUDIBLE SWALLOWING INFANT
INDICATION:

OTHER OVARIAN CYST, LEFT SIDE/ LABS 1ST.



COMPARISON:

Abdomen/pelvis CT dated 01/26/2021 and identified a left adnexal cyst.



TECHNIQUE:

Multiple sonographic images of the pelvis including transabdominal, endovaginal and

Doppler ultrasound.



FINDINGS:

The uterus is anteverted and small size measuring 4.9 x 2.4 x 2.4 cm.

The endometrium is not thickened measuring 3.7 mm.

Right ovary:

The right ovary is normal size measuring 2.4 x 1.3 x 1.0 cm.

There are no dominant right ovarian mass or cyst.



Left ovary:

The left ovary is enlarged measuring 6.7 x 2.9 x 5.5 cm.

The left ovary contains a large complex multi septated cyst measuring 5.7 x 2.2 x 5.2

cm.

There is no free fluid in the pelvis.



There is vascular flow in both ovaries with Doppler evaluation.

The Doppler resistive index in the parenchymal arteries of the right ovary is 0.56 and

left ovary 0.67.



IMPRESSION:

Large complex left adnexal cyst containing septations maximally measuring 5.7 cm.





<Electronically signed by Jamil Wade > 03/22/21 [id number]
(2) spontaneous and intermittent (24 hrs old)

## 2021-04-01 NOTE — DISCHARGE NOTE OB - IF YOU ARE A SMOKER, IT IS IMPORTANT FOR YOUR HEALTH TO STOP SMOKING. PLEASE BE AWARE THAT SECOND HAND SMOKE IS ALSO HARMFUL.
Quality 110: Preventive Care And Screening: Influenza Immunization: Influenza Immunization Administered during Influenza season Detail Level: Detailed Statement Selected Quality 226: Preventive Care And Screening: Tobacco Use: Screening And Cessation Intervention: Patient screened for tobacco use and is an ex/non-smoker Quality 402: Tobacco Use And Help With Quitting Among Adolescents: Patient screened for tobacco and never smoked

## 2021-05-03 NOTE — OB RN PATIENT PROFILE - NSNUTRITIONRISK_GI_A_CORE
1) Continue current diet order with Glucerna TID  2) Continue MVI supplementation daily   3) Encourage small, frequent meals   4) RD to remain available for additional nutrition education PRN
No indicators present

## 2022-12-30 NOTE — OB PST NOTE - PRETERM DELIVERIES, OB PROFILE
Patient with no symptom but displaced cholecystostomy tube.   Ct scan showed a small size GB with thickened wall  IR attempted to re-insert the tube but tract was closed   No clinical sign of acute cholecystitis   Stable
1

## 2023-02-02 NOTE — DISCHARGE NOTE OB - NS AS DC AMI YN
Photo Preface (Leave Blank If You Do Not Want): Photographs were obtained today
Detail Level: Detailed
no
